# Patient Record
Sex: MALE | Race: WHITE | Employment: FULL TIME | URBAN - METROPOLITAN AREA
[De-identification: names, ages, dates, MRNs, and addresses within clinical notes are randomized per-mention and may not be internally consistent; named-entity substitution may affect disease eponyms.]

---

## 2018-02-16 ENCOUNTER — OFFICE VISIT (OUTPATIENT)
Dept: INTERNAL MEDICINE CLINIC | Age: 63
End: 2018-02-16

## 2018-02-16 VITALS
OXYGEN SATURATION: 97 % | DIASTOLIC BLOOD PRESSURE: 100 MMHG | HEIGHT: 67 IN | TEMPERATURE: 98.3 F | HEART RATE: 74 BPM | WEIGHT: 171 LBS | BODY MASS INDEX: 26.84 KG/M2 | RESPIRATION RATE: 18 BRPM | SYSTOLIC BLOOD PRESSURE: 160 MMHG

## 2018-02-16 DIAGNOSIS — I10 ESSENTIAL HYPERTENSION: ICD-10-CM

## 2018-02-16 DIAGNOSIS — H71.91 CHOLESTEATOMA OF RIGHT EAR: ICD-10-CM

## 2018-02-16 LAB — HBA1C MFR BLD HPLC: 7.5 %

## 2018-02-16 RX ORDER — METFORMIN HYDROCHLORIDE 1000 MG/1
1000 TABLET ORAL 2 TIMES DAILY WITH MEALS
Qty: 60 TAB | Refills: 2 | Status: SHIPPED | OUTPATIENT
Start: 2018-02-16 | End: 2018-08-17 | Stop reason: SDUPTHER

## 2018-02-16 RX ORDER — GLIPIZIDE 10 MG/1
10 TABLET ORAL 2 TIMES DAILY
Qty: 60 TAB | Refills: 2 | Status: SHIPPED | OUTPATIENT
Start: 2018-02-16 | End: 2018-09-04 | Stop reason: SDUPTHER

## 2018-02-16 RX ORDER — LISINOPRIL 40 MG/1
40 TABLET ORAL DAILY
Qty: 30 TAB | Refills: 2 | Status: SHIPPED | OUTPATIENT
Start: 2018-02-16 | End: 2018-09-04 | Stop reason: SDUPTHER

## 2018-02-16 RX ORDER — HYDROCHLOROTHIAZIDE 25 MG/1
25 TABLET ORAL DAILY
Qty: 30 TAB | Refills: 2 | Status: SHIPPED | OUTPATIENT
Start: 2018-02-16 | End: 2018-09-04 | Stop reason: SDUPTHER

## 2018-02-16 RX ORDER — LISINOPRIL 10 MG/1
TABLET ORAL DAILY
COMMUNITY
End: 2018-02-16 | Stop reason: SDUPTHER

## 2018-02-17 NOTE — PROGRESS NOTES
Subjective:     Chief Complaint   Patient presents with   [facility] Follow Up    Medication Refill    Hypertension    Diabetes        He  is a 60y.o. year old male with h/o DM, HTN who presents today after two years for follow up on his chronic condition . He reports that in December 207 he was hospitalized for right ear cholesteatoma and elevated blood sugar. Reports that he was admitted for three weeks, had surgery in his right ear. States that in the hospital he was started on Insulin and continued metformin and Glipizide. He is currently on 10 units of Novolin 70/30 in AM and PM.  He ran out of meds for few days . Does not check BS at home. His blood pressure is elevated today, states he ran out of his blood pressure medication 1 day ago, needs refills. He is currently on Lisinopril 10 mg and HCTZ 25 mg. He was on lisinopril 40 mg in the past but he is not sure why lisinopril was changed to 10 in the hospital.    Patient denies any fever, malaise, chills, shortness of breath  at rest or exertion, wheezing, chest pain or palpitation. Pertinent items are noted in HPI. Objective:     Vitals:    02/16/18 1414 02/16/18 1442   BP: (!) 182/107 (!) 160/100   Pulse: 74    Resp: 18    Temp: 98.3 °F (36.8 °C)    TempSrc: Temporal    SpO2: 97%    Weight: 171 lb (77.6 kg)    Height: 5' 7\" (1.702 m)        Physical Examination: General appearance - alert, well appearing, and in no distress, oriented to person, place, and time and normal appearing weight  Mental status - alert, oriented to person, place, and time, normal mood, behavior, speech, dress, motor activity, and thought processes  Ears - decrease hearing on right ear. No oozing noted. Cholesteatoma in right era.    Chest - clear to auscultation, no wheezes, rales or rhonchi, symmetric air entry  Heart - normal rate, regular rhythm, normal S1, S2, no murmurs, rubs, clicks or gallops    No Known Allergies   Social History     Social History    Marital status:      Spouse name: N/A    Number of children: N/A    Years of education: N/A     Social History Main Topics    Smoking status: Current Every Day Smoker     Packs/day: 0.25    Smokeless tobacco: Never Used    Alcohol use No    Drug use: Yes     Special: Marijuana      Comment: once per month    Sexual activity: Yes     Other Topics Concern    None     Social History Narrative      Family History   Problem Relation Age of Onset    Hypertension Mother     Stroke Mother       Past Surgical History:   Procedure Laterality Date    HX APPENDECTOMY      HX ORTHOPAEDIC      LEFT ankle fracture repair. Metal placed. Past Medical History:   Diagnosis Date    Diabetes ([facility] [address]     Hypertension     Other ill-defined conditions(799.89)     Diverticulitis      Current Outpatient Prescriptions   Medication Sig Dispense Refill    insulin NPH/insulin regular (NOVOLIN 70/30 U-100 INSULIN) 100 unit/mL (70-30) injection 10 Units by SubCUTAneous route Daily (before breakfast).  hydroCHLOROthiazide (HYDRODIURIL) 25 mg tablet Take 1 Tab by mouth daily. Indications: hypertension 30 Tab 2    lisinopril (PRINIVIL, ZESTRIL) 40 mg tablet Take 1 Tab by mouth daily. 30 Tab 2    metFORMIN (GLUCOPHAGE) 1,000 mg tablet Take 1 Tab by mouth two (2) times daily (with meals). Indications: type 2 diabetes mellitus 60 Tab 2    glipiZIDE (GLUCOTROL) 10 mg tablet Take 1 Tab by mouth two (2) times a day. Indications: type 2 diabetes mellitus 60 Tab 2    Lancets (FREESTYLE LANCETS) misc Check fasting  and before dinner sugar 1 Each 11    glucose blood VI test strips (FREESTYLE LITE STRIPS) strip Check fasting  and before dinner sugar 100 Strip 11    Blood-Glucose Meter (FREESTYLE LITE METER) monitoring kit Check fasting  and before dinner sugar 1 Kit 0        Assessment/ Plan:   Diagnoses and all orders for this visit:    1.  Uncontrolled type 2 diabetes mellitus with complication, with long-term current use of insulin ([facility])  -     AMB POC HEMOGLOBIN A1C is 7.5. Continue current med. Follow up in 3 months. Lab Results   Component Value Date/Time    Hemoglobin A1c 11.3 (H) 09/22/2015 08:59 AM    Hemoglobin A1c (POC) 7.5 02/16/2018 02:25 PM       -     METABOLIC PANEL, BASIC    2. Essential hypertension  -     hydroCHLOROthiazide (HYDRODIURIL) 25 mg tablet; Take 1 Tab by mouth daily. Indications: hypertension  -     METABOLIC PANEL, BASIC  -     lisinopril (PRINIVIL, ZESTRIL) 40 mg tablet; Take 1 Tab by mouth daily.  -    BP is uncontrolled. Did not take med for only one day. Increase lisinopril to previous dose. -    Advised to call in two weeks with BP number. 3. Uncontrolled type 2 diabetes mellitus without complication, with long-term current use of insulin ([facility])  -     metFORMIN (GLUCOPHAGE) 1,000 mg tablet; Take 1 Tab by mouth two (2) times daily (with meals). Indications: type 2 diabetes mellitus  -     glipiZIDE (GLUCOTROL) 10 mg tablet; Take 1 Tab by mouth two (2) times a day. Indications: type 2 diabetes mellitus    4. Cholesteatoma of right ear         - continue follow up with ENT. Medication risks/benefits/costs/interactions/alternatives discussed with patient. Advised patient to call back or return to office if symptoms worsen/change/persist. If patient cannot reach us or should anything more severe/urgent arise he/she should proceed directly to the nearest emergency department. Discussed expected course/resolution/complications of diagnosis in detail with patient. Patient given a written after visit summary which includes her diagnoses, current medications and vitals. Patient expressed understanding with the diagnosis and plan. Follow-up Disposition:  Return in about 1 month (around 3/16/2018) for Bring BP log book. Spencer Sheets

## 2018-08-17 RX ORDER — METFORMIN HYDROCHLORIDE 1000 MG/1
TABLET ORAL
Qty: 60 TAB | Refills: 2 | Status: SHIPPED | OUTPATIENT
Start: 2018-08-17 | End: 2018-08-20 | Stop reason: SDUPTHER

## 2018-08-20 RX ORDER — METFORMIN HYDROCHLORIDE 1000 MG/1
TABLET ORAL
Qty: 60 TAB | Refills: 0 | Status: SHIPPED | OUTPATIENT
Start: 2018-08-20 | End: 2018-09-04 | Stop reason: SDUPTHER

## 2018-09-04 ENCOUNTER — OFFICE VISIT (OUTPATIENT)
Dept: INTERNAL MEDICINE CLINIC | Age: 63
End: 2018-09-04

## 2018-09-04 VITALS
OXYGEN SATURATION: 97 % | TEMPERATURE: 97.4 F | WEIGHT: 184.6 LBS | BODY MASS INDEX: 28.97 KG/M2 | HEART RATE: 83 BPM | DIASTOLIC BLOOD PRESSURE: 115 MMHG | RESPIRATION RATE: 18 BRPM | SYSTOLIC BLOOD PRESSURE: 193 MMHG | HEIGHT: 67 IN

## 2018-09-04 DIAGNOSIS — I10 ESSENTIAL HYPERTENSION: ICD-10-CM

## 2018-09-04 DIAGNOSIS — E11.29 TYPE 2 DIABETES MELLITUS WITH MICROALBUMINURIA, WITH LONG-TERM CURRENT USE OF INSULIN (HCC): Primary | ICD-10-CM

## 2018-09-04 DIAGNOSIS — R80.9 TYPE 2 DIABETES MELLITUS WITH MICROALBUMINURIA, WITH LONG-TERM CURRENT USE OF INSULIN (HCC): Primary | ICD-10-CM

## 2018-09-04 DIAGNOSIS — G47.9 SLEEPING DIFFICULTIES: ICD-10-CM

## 2018-09-04 DIAGNOSIS — Z79.4 TYPE 2 DIABETES MELLITUS WITH MICROALBUMINURIA, WITH LONG-TERM CURRENT USE OF INSULIN (HCC): Primary | ICD-10-CM

## 2018-09-04 LAB — HBA1C MFR BLD HPLC: 6.3 %

## 2018-09-04 RX ORDER — METFORMIN HYDROCHLORIDE 1000 MG/1
TABLET ORAL
Qty: 60 TAB | Refills: 2 | Status: SHIPPED | OUTPATIENT
Start: 2018-09-04 | End: 2019-05-10 | Stop reason: SDUPTHER

## 2018-09-04 RX ORDER — HYDROCHLOROTHIAZIDE 25 MG/1
25 TABLET ORAL DAILY
Qty: 90 TAB | Refills: 0 | Status: SHIPPED | OUTPATIENT
Start: 2018-09-04 | End: 2019-05-10 | Stop reason: SDUPTHER

## 2018-09-04 RX ORDER — PEN NEEDLE, DIABETIC 30 GX3/16"
NEEDLE, DISPOSABLE MISCELLANEOUS
Qty: 1 PACKAGE | Refills: 11 | Status: SHIPPED | OUTPATIENT
Start: 2018-09-04

## 2018-09-04 RX ORDER — GLIPIZIDE 10 MG/1
10 TABLET ORAL 2 TIMES DAILY
Qty: 60 TAB | Refills: 2 | Status: SHIPPED | OUTPATIENT
Start: 2018-09-04 | End: 2019-05-10 | Stop reason: SDUPTHER

## 2018-09-04 RX ORDER — LISINOPRIL 40 MG/1
40 TABLET ORAL DAILY
Qty: 90 TAB | Refills: 0 | Status: SHIPPED | OUTPATIENT
Start: 2018-09-04 | End: 2019-05-10 | Stop reason: SDUPTHER

## 2018-09-04 NOTE — PROGRESS NOTES
Subjective: Chief Complaint Patient presents with  Diabetes  Sleep Problem  
  trouble sleeping x 2 months. States that he has to smoke marijuana to sleep  Hypertension  Medication Refill  
  states that he has been out of all medication x 2 days He  is a 58y.o. year old male with h/o DM, HTN who presents today after 7 months  for follow up on his chronic condition . DM-2: He was started on Insulin and continued metformin and Glipizide about 9 months ago in the hospital.   He is currently on 10 units of Novolin 70/30 in AM and PM. He ran out of meds 5 days ago. States that his FBS has been running in 90- lower 100s range and during supper its around 120s. HTN: His blood pressure is elevated today, states he ran out of his blood pressure medication 5 days ago, needs refills. He is currently on Lisinopril 40 mg and HCTZ 25 mg. He is also here with a c/o difficulty sleeping. Reports that he has been using marijuana to help with sleep. Never tried any OTC supplement. States that he smoke 3-4 cig /day. No plan on quitting. Denies any chest pain, soa, cough, leg swelling, urinary symptoms. Reports that he had his eye exam 2-3 months ago. Advised to bring records. Pertinent items are noted in HPI. Objective:  
 
Vitals:  
 09/04/18 0740 BP: (!) 193/115 Pulse: 83 Resp: 18 Temp: 97.4 °F (36.3 °C) TempSrc: Temporal  
SpO2: 97% Weight: 184 lb 9.6 oz (83.7 kg) Height: 5' 7\" (1.702 m) Physical Examination: General appearance - alert, well appearing, and in no distress, oriented to person, place, and time and normal appearing weight Mental status - alert, oriented to person, place, and time, normal mood, behavior, speech, dress, motor activity, and thought processes Eyes - pupils equal and reactive, extraocular eye movements intact Chest - clear to auscultation, no wheezes, rales or rhonchi, symmetric air entry Heart - normal rate, regular rhythm, normal S1, S2, no murmurs, rubs, clicks or gallops Extremities - no pedal edema noted No Known Allergies Social History Social History  Marital status:  Spouse name: N/A  
 Number of children: N/A  
 Years of education: N/A Social History Main Topics  Smoking status: Current Every Day Smoker Packs/day: 0.25  Smokeless tobacco: Never Used  Alcohol use No  
 Drug use: Yes Special: Marijuana Comment: once per month  Sexual activity: Yes Other Topics Concern  None Social History Narrative Family History Problem Relation Age of Onset  Hypertension Mother  Stroke Mother Past Surgical History:  
Procedure Laterality Date  HX APPENDECTOMY  HX ORTHOPAEDIC    
 LEFT ankle fracture repair. Metal placed. Past Medical History:  
Diagnosis Date  Diabetes ([facility] [address]  Hypertension  Other ill-defined conditions(799.89) Diverticulitis Current Outpatient Prescriptions Medication Sig Dispense Refill  metFORMIN (GLUCOPHAGE) 1,000 mg tablet TAKE 1 TABLET BY MOUTH TWICE DAILY WITH MEALS FOR DIABETES 60 Tab 2  
 glipiZIDE (GLUCOTROL) 10 mg tablet Take 1 Tab by mouth two (2) times a day. Indications: type 2 diabetes mellitus 60 Tab 2  
 insulin NPH/insulin regular (NOVOLIN 70/30 U-100 INSULIN) 100 unit/mL (70-30) injection 10 Units by SubCUTAneous route two (2) times daily (with meals). 10 mL 2  
 lisinopril (PRINIVIL, ZESTRIL) 40 mg tablet Take 1 Tab by mouth daily. 90 Tab 0  
 hydroCHLOROthiazide (HYDRODIURIL) 25 mg tablet Take 1 Tab by mouth daily. Indications: hypertension 90 Tab 0  
 Insulin Needles, Disposable, 31 gauge x 5/16\" ndle Use twice/day. 1 Package 11  Lancets (FREESTYLE LANCETS) misc Check fasting  and before dinner sugar 1 Each 11  
 glucose blood VI test strips (FREESTYLE LITE STRIPS) strip Check fasting  and before dinner sugar 100 Strip 11  
  Blood-Glucose Meter (FREESTYLE LITE METER) monitoring kit Check fasting  and before dinner sugar 1 Kit 0 Assessment/ Plan:  
Diagnoses and all orders for this visit: 
 
1. Type 2 diabetes mellitus with microalbuminuria, with long-term current use of insulin (HCC) -     AMB POC HEMOGLOBIN A1C 
-     MICROALBUMIN, UR, RAND W/ MICROALB/CREAT RATIO 
-     metFORMIN (GLUCOPHAGE) 1,000 mg tablet; TAKE 1 TABLET BY MOUTH TWICE DAILY WITH MEALS FOR DIABETES 
-     glipiZIDE (GLUCOTROL) 10 mg tablet; Take 1 Tab by mouth two (2) times a day. Indications: type 2 diabetes mellitus 
-     insulin NPH/insulin regular (NOVOLIN 70/30 U-100 INSULIN) 100 unit/mL (70-30) injection; 10 Units by SubCUTAneous route two (2) times daily (with meals). -     METABOLIC PANEL, COMPREHENSIVE 
-     Insulin Needles, Disposable, 31 gauge x 5/16\" ndle; Use twice/day. 2. Essential hypertension -     BP is very elevated today. Did not take med for the past 5 days. Restart lisinopril (PRINIVIL, ZESTRIL) 40 mg tablet; Take 1 Tab by mouth daily. 
-    Restart  hydroCHLOROthiazide (HYDRODIURIL) 25 mg tablet; Take 1 Tab by mouth daily. Indications: hypertension -     METABOLIC PANEL, COMPREHENSIVE 3. Sleeping difficulties - Advised to try OTC Melatonin. Follow up if symptoms worsen. Medication risks/benefits/costs/interactions/alternatives discussed with patient. Advised patient to call back or return to office if symptoms worsen/change/persist. If patient cannot reach us or should anything more severe/urgent arise he/she should proceed directly to the nearest emergency department. Discussed expected course/resolution/complications of diagnosis in detail with patient. Patient given a written after visit summary which includes her diagnoses, current medications and vitals. Patient expressed understanding with the diagnosis and plan. Follow-up Disposition: Return in about 2 weeks (around 9/18/2018) for Bring BP log book. . will have cholesterol check up. Charley Mckee

## 2018-09-04 NOTE — MR AVS SNAPSHOT
[facility] [address] [place] [address] 
[phone] Patient: Ady Herrera MRN: [id number] CMO:8/5/[id number] Visit Information Date & Time Provider Department Dept. Phone Encounter #  
 9/4/2018  7:30 AM Edwardo Morgan MD [facility] Internal Medicine [phone] [id number] Follow-up Instructions Return in about 2 weeks (around 9/18/2018) for Bring BP log book. . will have cholesterol check up. Jefe Centeno Upcoming Health Maintenance Date Due  
 LIPID PANEL Q1 1955 EYE EXAM RETINAL OR DILATED Q1 7/4/1964 Pneumococcal 19-64 Medium Risk (1 of 1 - PPSV23) 7/4/1973 DTaP/Tdap/Td series (1 - Tdap) 7/4/1975 MICROALBUMIN Q1 9/22/2016 FOBT Q 1 YEAR AGE 50-75 10/6/2016 FOOT EXAM Q1 1/9/2017 Influenza Age 5 to Adult 8/1/2018 HEMOGLOBIN A1C Q6M 8/16/2018 Allergies as of 9/4/2018  Review Complete On: 9/4/2018 By: Berry Garcia MD  
 No Known Allergies Current Immunizations  Reviewed on 9/4/2018 Name Date Influenza Vaccine Lavaun Sorrel) 9/22/2015 Pneumococcal Conjugate (PCV-13) 9/22/2015 Reviewed by Berry Garcia MD on 9/4/2018 at  7:50 AM  
You Were Diagnosed With   
  
 Codes Comments Uncontrolled type 2 diabetes mellitus with complication, with long-term current use of insulin (HCC)    -  Primary ICD-10-CM: E11.8, E11.65, Z79.4 ICD-9-CM: 250.82, V58.67 Essential hypertension     ICD-10-CM: I10 
ICD-9-CM: 401.9 Vitals BP Pulse Temp Resp Height(growth percentile) (!) 193/115 (BP 1 Location: Left arm, BP Patient Position: Sitting) 83 97.4 °F (36.3 °C) (Temporal) 18 5' 7\" (1.702 m) Weight(growth percentile) SpO2 BMI Smoking Status 184 lb 9.6 oz (83.7 kg) 97% 28.91 kg/m2 Current Every Day Smoker Vitals History BMI and BSA Data Body Mass Index Body Surface Area  
 28.91 kg/m 2 1.99 m 2 Preferred Pharmacy Pharmacy Name Phone [address], [address] [place] [address] [phone] Your Updated Medication List  
  
   
This list is accurate as of 9/4/18  7:58 AM.  Always use your most recent med list.  
  
  
  
  
 Blood-Glucose Meter monitoring kit Commonly known as:  FREESTYLE LITE METER Check fasting  and before dinner sugar  
  
 glipiZIDE 10 mg tablet Commonly known as:  Gladis Echo Take 1 Tab by mouth two (2) times a day. Indications: type 2 diabetes mellitus  
  
 glucose blood VI test strips strip Commonly known as:  FREESTYLE LITE STRIPS Check fasting  and before dinner sugar  
  
 hydroCHLOROthiazide 25 mg tablet Commonly known as:  HYDRODIURIL Take 1 Tab by mouth daily. Indications: hypertension  
  
 insulin NPH/insulin regular 100 unit/mL (70-30) injection Commonly known as:  NovoLIN 70/30 U-100 Insulin 10 Units by SubCUTAneous route two (2) times daily (with meals). Lancets Misc Commonly known as:  FREESTYLE LANCETS Check fasting  and before dinner sugar  
  
 lisinopril 40 mg tablet Commonly known as:  Thersia Kubas Take 1 Tab by mouth daily. metFORMIN 1,000 mg tablet Commonly known as:  GLUCOPHAGE  
TAKE 1 TABLET BY MOUTH TWICE DAILY WITH MEALS FOR DIABETES Prescriptions Sent to Pharmacy Refills  
 metFORMIN (GLUCOPHAGE) 1,000 mg tablet 2 Sig: TAKE 1 TABLET BY MOUTH TWICE DAILY WITH MEALS FOR DIABETES Class: Normal  
 Pharmacy: [address] [place] [address] Ph #: [phone]  
 glipiZIDE (GLUCOTROL) 10 mg tablet 2 Sig: Take 1 Tab by mouth two (2) times a day. Indications: type 2 diabetes mellitus Class: Normal  
 Pharmacy: [address] [place] [address]  Ph #: [phone] Route: Oral  
 insulin NPH/insulin regular (NOVOLIN 70/30 U-100 INSULIN) 100 unit/mL (70-30) injection 2 Sig: 10 Units by SubCUTAneous route two (2) times daily (with meals).   
 Class: Normal  
 Pharmacy: [address] [place] [address] Dr Ph #: [phone] Route: SubCUTAneous  
 lisinopril (PRINIVIL, ZESTRIL) 40 mg tablet 0 Sig: Take 1 Tab by mouth daily. Class: Normal  
 Pharmacy: [address] [place] [address] Dr Ph #: [phone] Route: Oral  
 hydroCHLOROthiazide (HYDRODIURIL) 25 mg tablet 0 Sig: Take 1 Tab by mouth daily. Indications: hypertension Class: Normal  
 Pharmacy: [address] [place] [address] Dr Ph #: [phone] Route: Oral  
  
We Performed the Following AMB POC HEMOGLOBIN A1C [33445 CPT(R)] METABOLIC PANEL, COMPREHENSIVE [16719 CPT(R)] MICROALBUMIN, UR, RAND W/ MICROALB/CREAT RATIO [id number] CPT(R)] Follow-up Instructions Return in about 2 weeks (around 9/18/2018) for Bring BP log book. . will have cholesterol check up. .  
  
  
Introducing [facility]! [employer] introduces [employer] patient portal. Now you can access parts of your medical record, email your doctor's office, and request medication refills online. 1. In your internet browser, go to [email or web address] 2. Click on the First Time User? Click Here link in the Sign In box. You will see the New Member Sign Up page. 3. Enter your [employer] Access Code exactly as it appears below. You will not need to use this code after youve completed the sign-up process. If you do not sign up before the expiration date, you must request a new code. · [employer] Access Code: [email or web address] Expires: 12/3/2018  7:42 AM 
 
4. Enter the last four digits of your Social Security Number (xxxx) and Date of Birth (mm/dd/yyyy) as indicated and click Submit. You will be taken to the next sign-up page. 5. Create a [employer] ID. This will be your [employer] login ID and cannot be changed, so think of one that is secure and easy to remember. 6. Create a [employer] password. You can change your password at any time. 7. Enter your Password Reset Question and Answer. This can be used at a later time if you forget your password. 8. Enter your e-mail address. You will receive e-mail notification when new information is available in [address] 9. Click Sign Up. You can now view and download portions of your medical record. 10. Click the Download Summary menu link to download a portable copy of your medical information. If you have questions, please visit the Frequently Asked Questions section of the [employer] website. Remember, [employer] is NOT to be used for urgent needs. For medical emergencies, dial 911. Now available from your iPhone and Android! Please provide this summary of care documentation to your next provider. Your primary care clinician is listed as Edwardo Mathew. If you have any questions after today's visit, please call [phone].

## 2018-09-05 LAB
ALBUMIN SERPL-MCNC: 4.5 G/DL (ref 3.6–4.8)
ALBUMIN/CREAT UR: 22.8 MG/G CREAT (ref 0–30)
ALBUMIN/GLOB SERPL: 1.6 {RATIO} (ref 1.2–2.2)
ALP SERPL-CCNC: 82 IU/L (ref 39–117)
ALT SERPL-CCNC: 32 IU/L (ref 0–44)
AST SERPL-CCNC: 21 IU/L (ref 0–40)
BILIRUB SERPL-MCNC: 0.5 MG/DL (ref 0–1.2)
BUN SERPL-MCNC: 12 MG/DL (ref 8–27)
BUN/CREAT SERPL: 10 (ref 10–24)
CALCIUM SERPL-MCNC: 9.7 MG/DL (ref 8.6–10.2)
CHLORIDE SERPL-SCNC: 101 MMOL/L (ref 96–106)
CO2 SERPL-SCNC: 25 MMOL/L (ref 20–29)
CREAT SERPL-MCNC: 1.24 MG/DL (ref 0.76–1.27)
CREAT UR-MCNC: 151.7 MG/DL
GLOBULIN SER CALC-MCNC: 2.8 G/DL (ref 1.5–4.5)
GLUCOSE SERPL-MCNC: 123 MG/DL (ref 65–99)
MICROALBUMIN UR-MCNC: 34.6 UG/ML
POTASSIUM SERPL-SCNC: 4.7 MMOL/L (ref 3.5–5.2)
PROT SERPL-MCNC: 7.3 G/DL (ref 6–8.5)
SODIUM SERPL-SCNC: 142 MMOL/L (ref 134–144)

## 2019-04-05 NOTE — PATIENT INSTRUCTIONS
12 hour chart check    Cholesteatoma: Care Instructions  Your Care Instructions    A cholesteatoma (say \"kuh-LESS-lolis--ATTILA[facility]\") is a growth (cyst) inside your ear. The most common cause is repeated ear infections. Once the growth starts, ear infections make it grow larger. You may feel pain and pressure in or near your ear. You also may have bad-smelling fluid that drains from your ear. You may lose hearing in that ear. Hearing may come back after the growth is removed. Your doctor may do tests to find out the size and shape of the growth. He or she may clean your ear and prescribe antibiotics to stop the infection. In most cases, people have surgery to remove the growth. After surgery, you will need follow-up care to be sure the growth and infection are gone. Follow-up care is a key part of your treatment and safety. Be sure to make and go to all appointments, and call your doctor if you are having problems. It's also a good idea to know your test results and keep a list of the medicines you take. How can you care for yourself at home? · If your doctor prescribed antibiotics, take them as directed. Do not stop taking them just because you feel better. You need to take the full course of antibiotics. · Take an over-the-counter pain medicine, such as acetaminophen (Tylenol), ibuprofen (Advil, Motrin), or naproxen (Aleve), as needed. Read and follow all instructions on the label. · Do not take two or more pain medicines at the same time unless the doctor told you to. Many pain medicines have acetaminophen, which is Tylenol. Too much acetaminophen (Tylenol) can be harmful. · To ease pain, put a warm washcloth or a heating pad set on low on your ear. You may have some drainage from the ear. · If you are going to have the growth removed, your doctor will give you instructions for care before and after surgery. · Do not put anything into your ear canal. For example, do not use a cotton swab to clean the inside of your ear.  It can damage the inside of your ear. If you think you have something inside your ear, ask your doctor to check it. When should you call for help? Call your doctor now or seek immediate medical care if:  ? · Your pain gets worse. ? · You have signs of infection, such as:  ¨ Increased pain, swelling, warmth, or redness. ¨ Pus draining from the ear. ¨ A fever. ? · The side of your face seems to sag. ? Watch closely for changes in your health, and be sure to contact your doctor if:  ? · You feel dizzy. ? · You do not get better as expected. Where can you learn more? Go to http://gogo-sara.info/. Enter A069 in the search box to learn more about \"Cholesteatoma: Care Instructions. \"  Current as of: May 12, 2017  Content Version: 11.4  © 5841-2646 [employer]. Care instructions adapted under license by [employer] (which disclaims liability or warranty for this information). If you have questions about a medical condition or this instruction, always ask your healthcare professional. [address] any warranty or liability for your use of this information.

## 2019-05-10 ENCOUNTER — OFFICE VISIT (OUTPATIENT)
Dept: PRIMARY CARE CLINIC | Age: 64
End: 2019-05-10

## 2019-05-10 VITALS
SYSTOLIC BLOOD PRESSURE: 150 MMHG | RESPIRATION RATE: 16 BRPM | OXYGEN SATURATION: 99 % | HEART RATE: 99 BPM | WEIGHT: 187 LBS | BODY MASS INDEX: 29.35 KG/M2 | HEIGHT: 67 IN | DIASTOLIC BLOOD PRESSURE: 105 MMHG | TEMPERATURE: 98.1 F

## 2019-05-10 DIAGNOSIS — E11.29 TYPE 2 DIABETES MELLITUS WITH MICROALBUMINURIA, WITH LONG-TERM CURRENT USE OF INSULIN (HCC): ICD-10-CM

## 2019-05-10 DIAGNOSIS — I10 ESSENTIAL HYPERTENSION: ICD-10-CM

## 2019-05-10 DIAGNOSIS — R80.9 TYPE 2 DIABETES MELLITUS WITH MICROALBUMINURIA, WITH LONG-TERM CURRENT USE OF INSULIN (HCC): ICD-10-CM

## 2019-05-10 DIAGNOSIS — Z79.4 TYPE 2 DIABETES MELLITUS WITH MICROALBUMINURIA, WITH LONG-TERM CURRENT USE OF INSULIN (HCC): ICD-10-CM

## 2019-05-10 LAB — HBA1C MFR BLD HPLC: 6.5 %

## 2019-05-10 RX ORDER — GLIPIZIDE 10 MG/1
10 TABLET ORAL 2 TIMES DAILY
Qty: 60 TAB | Refills: 2 | Status: SHIPPED | OUTPATIENT
Start: 2019-05-10 | End: 2019-10-21 | Stop reason: SDUPTHER

## 2019-05-10 RX ORDER — METFORMIN HYDROCHLORIDE 1000 MG/1
TABLET ORAL
Qty: 60 TAB | Refills: 2 | Status: SHIPPED | OUTPATIENT
Start: 2019-05-10 | End: 2019-10-21 | Stop reason: SDUPTHER

## 2019-05-10 RX ORDER — HYDROCHLOROTHIAZIDE 25 MG/1
25 TABLET ORAL DAILY
Qty: 90 TAB | Refills: 0 | Status: SHIPPED | OUTPATIENT
Start: 2019-05-10 | End: 2019-10-21 | Stop reason: SDUPTHER

## 2019-05-10 RX ORDER — LISINOPRIL 40 MG/1
40 TABLET ORAL DAILY
Qty: 90 TAB | Refills: 0 | Status: SHIPPED | OUTPATIENT
Start: 2019-05-10 | End: 2019-10-21 | Stop reason: SDUPTHER

## 2019-05-10 NOTE — PROGRESS NOTES
Subjective: Chief Complaint Patient presents with  Medication Refill  
  all meds  Diabetes a1c check He  is a 58y.o. year old male h/o DM, HTN who presents today after 7 months  for follow up on his chronic condition . 
  
DM-2: He was started on Insulin and continued metformin and Glipizide.   He is currently on 10 units of Novolin 70/30 in AM and PM. He reports that he is taking insulin  only one time /day instead. . States that his FBS has been running in 90- lower 100s range and during supper its around 120s. last A1c was 6.3 HTN: His blood pressure is elevated today, . He is currently on Lisinopril 40 mg and HCTZ 25 mg.  
  
 
  
States that he smoke 3-4 cig /day. No plan on quitting.  
  
Denies any chest pain, soa, cough, leg swelling, urinary symptoms. Reports that he had his eye exam with one year. Advised to bring records. Pertinent items are noted in HPI. Objective:  
 
Vitals:  
 05/10/19 0901 BP: (!) 150/105 Pulse: 99 Resp: 16 Temp: 98.1 °F (36.7 °C) TempSrc: Oral  
SpO2: 99% Weight: 187 lb (84.8 kg) Height: 5' 7\" (1.702 m) Physical Examination: General appearance - alert, well appearing, and in no distress and oriented to person, place, and time Mental status - alert, oriented to person, place, and time Chest - clear to auscultation, no wheezes, rales or rhonchi, symmetric air entry Heart - normal rate, regular rhythm, normal S1, S2, no murmurs, rubs, clicks or gallops Extremities - peripheral pulses normal, no pedal edema, no clubbing or cyanosis, No Known Allergies Social History Socioeconomic History  Marital status:  Spouse name: Not on file  Number of children: Not on file  Years of education: Not on file  Highest education level: Not on file Tobacco Use  Smoking status: Current Every Day Smoker Packs/day: 0.25  Smokeless tobacco: Never Used Substance and Sexual Activity  Alcohol use:  No  
  Drug use: Yes Types: Marijuana  Sexual activity: Yes Family History Problem Relation Age of Onset  Hypertension Mother  Stroke Mother Past Surgical History:  
Procedure Laterality Date  HX APPENDECTOMY  HX ORTHOPAEDIC    
 LEFT ankle fracture repair. Metal placed. Past Medical History:  
Diagnosis Date  Diabetes ([address]  Hypertension  Other ill-defined conditions(799.89) Diverticulitis Current Outpatient Medications Medication Sig Dispense Refill  metFORMIN (GLUCOPHAGE) 1,000 mg tablet TAKE 1 TABLET BY MOUTH TWICE DAILY WITH MEALS FOR DIABETES 60 Tab 2  
 glipiZIDE (GLUCOTROL) 10 mg tablet Take 1 Tab by mouth two (2) times a day. Indications: type 2 diabetes mellitus 60 Tab 2  
 insulin NPH/insulin regular (NOVOLIN 70/30 U-100 INSULIN) 100 unit/mL (70-30) injection 10 Units by SubCUTAneous route two (2) times daily (with meals). (Patient taking differently: 10 Units by SubCUTAneous route once.) 10 mL 2  
 lisinopril (PRINIVIL, ZESTRIL) 40 mg tablet Take 1 Tab by mouth daily. 90 Tab 0  
 hydroCHLOROthiazide (HYDRODIURIL) 25 mg tablet Take 1 Tab by mouth daily. Indications: hypertension 90 Tab 0  
 Insulin Needles, Disposable, 31 gauge x 5/16\" ndle Use twice/day. 1 Package 11  Lancets (FREESTYLE LANCETS) misc Check fasting  and before dinner sugar 1 Each 11  
 glucose blood VI test strips (FREESTYLE LITE STRIPS) strip Check fasting  and before dinner sugar 100 Strip 11  Blood-Glucose Meter (FREESTYLE LITE METER) monitoring kit Check fasting  and before dinner sugar 1 Kit 0 Assessment/ Plan:  
Diagnoses and all orders for this visit: 
 
1. Type 2 diabetes mellitus with microalbuminuria, with long-term current use of insulin ([facility]) -    Continue  glipiZIDE (GLUCOTROL) 10 mg tablet; Take 1 Tab by mouth two (2) times a day. Indications: type 2 diabetes mellitus -     Cut down insulin NPH/insulin regular (NOVOLIN 70/30 U-100 INSULIN) 100 unit/mL (70-30) injection; 5 Units by SubCUTAneous route two (2) times daily (with meals). -    Continue. metFORMIN (GLUCOPHAGE) 1,000 mg tablet; TAKE 1 TABLET BY MOUTH TWICE DAILY WITH MEALS FOR DIABETES 
-     AMB POC HEMOGLOBIN A1C is 6.5. Well controlled. 2. Essential hypertension -    BP is high in office toady. He was out of med for the past few days. compliance discussed. hydroCHLOROthiazide (HYDRODIURIL) 25 mg tablet; Take 1 Tab by mouth daily. Indications: high blood pressure 
-     lisinopril (PRINIVIL, ZESTRIL) 40 mg tablet; Take 1 Tab by mouth daily. patient does not have health insurance. Would like to wait for blood work and next appointment., Medication risks/benefits/costs/interactions/alternatives discussed with patient. Advised patient to call back or return to office if symptoms worsen/change/persist. If patient cannot reach us or should anything more severe/urgent arise he/she should proceed directly to the nearest emergency department. Discussed expected course/resolution/complications of diagnosis in detail with patient. Patient given a written after visit summary which includes her diagnoses, current medications and vitals. Patient expressed understanding with the diagnosis and plan. Follow-up and Dispositions · Return in about 3 months (around 8/10/2019) for Cholesterol, blood pressure. Herb Rivera

## 2019-05-10 NOTE — PROGRESS NOTES
Chief Complaint Patient presents with  Medication Refill  
  all meds  Diabetes a1c check 1. Have you been to the ER, urgent care clinic since your last visit? Hospitalized since your last visit? No 
 
2. Have you seen or consulted any other health care providers outside of the [facility] since your last visit? Include any pap smears or colon screening.  No

## 2019-08-16 ENCOUNTER — OFFICE VISIT (OUTPATIENT)
Dept: PRIMARY CARE CLINIC | Age: 64
End: 2019-08-16

## 2019-08-16 VITALS
OXYGEN SATURATION: 97 % | DIASTOLIC BLOOD PRESSURE: 85 MMHG | TEMPERATURE: 97.8 F | WEIGHT: 189.4 LBS | BODY MASS INDEX: 29.73 KG/M2 | HEIGHT: 67 IN | RESPIRATION RATE: 17 BRPM | SYSTOLIC BLOOD PRESSURE: 130 MMHG | HEART RATE: 101 BPM

## 2019-08-16 DIAGNOSIS — E11.29 TYPE 2 DIABETES MELLITUS WITH MICROALBUMINURIA, WITH LONG-TERM CURRENT USE OF INSULIN (HCC): Primary | ICD-10-CM

## 2019-08-16 DIAGNOSIS — J20.9 ACUTE BRONCHITIS, UNSPECIFIED ORGANISM: ICD-10-CM

## 2019-08-16 DIAGNOSIS — Z79.4 TYPE 2 DIABETES MELLITUS WITH MICROALBUMINURIA, WITH LONG-TERM CURRENT USE OF INSULIN (HCC): Primary | ICD-10-CM

## 2019-08-16 DIAGNOSIS — R80.9 TYPE 2 DIABETES MELLITUS WITH MICROALBUMINURIA, WITH LONG-TERM CURRENT USE OF INSULIN (HCC): Primary | ICD-10-CM

## 2019-08-16 DIAGNOSIS — R05.9 COUGH: ICD-10-CM

## 2019-08-16 DIAGNOSIS — I10 ESSENTIAL HYPERTENSION: ICD-10-CM

## 2019-08-16 LAB — HBA1C MFR BLD HPLC: 7.1 %

## 2019-08-16 RX ORDER — GUAIFENESIN 600 MG/1
600 TABLET, EXTENDED RELEASE ORAL 2 TIMES DAILY
Qty: 20 TAB | Refills: 0 | Status: SHIPPED | OUTPATIENT
Start: 2019-08-16 | End: 2022-05-17

## 2019-08-16 RX ORDER — METHYLPREDNISOLONE 4 MG/1
TABLET ORAL
Qty: 1 DOSE PACK | Refills: 0 | Status: SHIPPED | OUTPATIENT
Start: 2019-08-16 | End: 2022-05-17

## 2019-08-16 NOTE — PROGRESS NOTES
Subjective:     Chief Complaint   Patient presents with    Diabetes     a1c    Blood Pressure Check        He  is a 71y.o. year old male h/o DM, HTN who presents today  for follow up on his chronic condition as well as with acute concerns. Reports that for the past one week he has been having cough . Reports that symptoms started with cold symptoms followed by this coughing spells. Reports that cough is productive, mostly phlegm. Denies any fever, chills, chest pain, soa, wheezing. He had tried OTC cough syrup which helped some. He continue to smoke.     DM-2: he is on metformin and Glipizide and Insulin.   He is currently on 5 units of Novolin 70/30 in AM and PM.  States that his FBS has been running in 90- lower 100s range and during supper its around 120s. last A1c was 6.5     HTN: BP is well controlled with Lisinopril 40 mg and HCTZ 25 mg.           Pertinent items are noted in HPI. Objective:     Vitals:    08/16/19 1118   BP: (!) 146/97   Pulse: (!) 101   Resp: 17   Temp: 97.8 °F (36.6 °C)   TempSrc: Oral   SpO2: 97%   Weight: 189 lb 6.4 oz (85.9 kg)   Height: 5' 7\" (1.702 m)       Physical Examination: General appearance - alert, well appearing, and in no distress, oriented to person, place, and time and overweight  Mental status - alert, oriented to person, place, and time, normal mood, behavior, speech, dress, motor activity, and thought processes  Ears - bilateral TM's and external ear canals normal  Nose - normal and patent, no erythema, discharge or polyps  Mouth - mucous membranes moist, pharynx normal without lesions  Neck - supple, no significant adenopathy  Chest - clear to auscultation, no wheezes, rales or rhonchi, symmetric air entry. Coughing spells during expiration.    Heart - normal rate, regular rhythm, normal S1, S2, no murmurs, rubs, clicks or gallops      No Known Allergies   Social History     Socioeconomic History    Marital status:      Spouse name: Not on file    Number of children: Not on file    Years of education: Not on file    Highest education level: Not on file   Tobacco Use    Smoking status: Current Every Day Smoker     Packs/day: 0.25    Smokeless tobacco: Never Used   Substance and Sexual Activity    Alcohol use: No    Drug use: Yes     Types: Marijuana    Sexual activity: Yes      Family History   Problem Relation Age of Onset    Hypertension Mother     Stroke Mother       Past Surgical History:   Procedure Laterality Date    HX APPENDECTOMY      HX ORTHOPAEDIC      LEFT ankle fracture repair. Metal placed. Past Medical History:   Diagnosis Date    Diabetes ([facility] [address]     Hypertension     Other ill-defined conditions(799.89)     Diverticulitis      Current Outpatient Medications   Medication Sig Dispense Refill    glipiZIDE (GLUCOTROL) 10 mg tablet Take 1 Tab by mouth two (2) times a day. Indications: type 2 diabetes mellitus 60 Tab 2    hydroCHLOROthiazide (HYDRODIURIL) 25 mg tablet Take 1 Tab by mouth daily. Indications: high blood pressure 90 Tab 0    insulin NPH/insulin regular (NOVOLIN 70/30 U-100 INSULIN) 100 unit/mL (70-30) injection 5 Units by SubCUTAneous route two (2) times daily (with meals). 10 mL 2    lisinopril (PRINIVIL, ZESTRIL) 40 mg tablet Take 1 Tab by mouth daily. 90 Tab 0    metFORMIN (GLUCOPHAGE) 1,000 mg tablet TAKE 1 TABLET BY MOUTH TWICE DAILY WITH MEALS FOR DIABETES 60 Tab 2    Insulin Needles, Disposable, 31 gauge x 5/16\" ndle Use twice/day. 1 Package 11    Lancets (FREESTYLE LANCETS) misc Check fasting  and before dinner sugar 1 Each 11    glucose blood VI test strips (FREESTYLE LITE STRIPS) strip Check fasting  and before dinner sugar 100 Strip 11    Blood-Glucose Meter (FREESTYLE LITE METER) monitoring kit Check fasting  and before dinner sugar 1 Kit 0        Assessment/ Plan:   Diagnoses and all orders for this visit:    1.  Type 2 diabetes mellitus with microalbuminuria, with long-term current use of insulin ([facility]  -     AMB POC HEMOGLOBIN A1C is 7.1, up from 6.5.  -     LIPID PANEL  -     METABOLIC PANEL, COMPREHENSIVE  -    Increase  insulin NPH/insulin regular (NOVOLIN 70/30 U-100 INSULIN) 100 unit/mL (70-30) injection; 6 Units by SubCUTAneous route two (2) times daily (with meals). - continue Metformin and Glipizide. 2. Essential hypertension       - well controlled with current med. 3. Acute bronchitis, unspecified organism  -     methylPREDNISolone (MEDROL DOSEPACK) 4 mg tablet; Follow pack instruction  -     guaiFENesin ER (MUCINEX) 600 mg ER tablet; Take 1 Tab by mouth two (2) times a day. Strongly encouraged to quit smoking. 4. Cough  -     methylPREDNISolone (MEDROL DOSEPACK) 4 mg tablet; Follow pack instruction  -     guaiFENesin ER (MUCINEX) 600 mg ER tablet; Take 1 Tab by mouth two (2) times a day. Medication risks/benefits/costs/interactions/alternatives discussed with patient. Advised patient to call back or return to office if symptoms worsen/change/persist. If patient cannot reach us or should anything more severe/urgent arise he/she should proceed directly to the nearest emergency department. Discussed expected course/resolution/complications of diagnosis in detail with patient. Patient given a written after visit summary which includes her diagnoses, current medications and vitals. Patient expressed understanding with the diagnosis and plan. Follow-up and Dispositions    · Return in about 1 month (around 9/16/2019) for welcome to medicare .

## 2019-08-16 NOTE — PROGRESS NOTES
[place] Or is a 71 y.o. male    Chief Complaint   Patient presents with    Diabetes     a1c    Blood Pressure Check         1. Have you been to the ER, urgent care clinic since your last visit? Hospitalized since your last visit? No    2. Have you seen or consulted any other health care providers outside of the [address] [place] [address] since your last visit? Include any pap smears or colon screening. No    No flowsheet data found.      Health Maintenance Due   Topic Date Due    LIPID PANEL Q1  1955    EYE EXAM RETINAL OR DILATED  07/04/1964    DTaP/Tdap/Td series (1 - Tdap) 07/04/1975    Shingrix Vaccine Age 50> (1 of 2) 07/04/2004    FOBT Q 1 YEAR AGE 50-75  10/06/2016    FOOT EXAM Q1  01/15/2017    GLAUCOMA SCREENING Q2Y  07/04/2019    Pneumococcal 65+ years (2 of 2 - PPSV23) 07/04/2019    Influenza Age 9 to Adult  08/01/2019    MICROALBUMIN Q1  09/04/2019

## 2019-10-21 DIAGNOSIS — R80.9 TYPE 2 DIABETES MELLITUS WITH MICROALBUMINURIA, WITH LONG-TERM CURRENT USE OF INSULIN (HCC): ICD-10-CM

## 2019-10-21 DIAGNOSIS — E11.29 TYPE 2 DIABETES MELLITUS WITH MICROALBUMINURIA, WITH LONG-TERM CURRENT USE OF INSULIN (HCC): ICD-10-CM

## 2019-10-21 DIAGNOSIS — Z79.4 TYPE 2 DIABETES MELLITUS WITH MICROALBUMINURIA, WITH LONG-TERM CURRENT USE OF INSULIN (HCC): ICD-10-CM

## 2019-10-21 DIAGNOSIS — I10 ESSENTIAL HYPERTENSION: ICD-10-CM

## 2019-10-21 RX ORDER — METFORMIN HYDROCHLORIDE 1000 MG/1
TABLET ORAL
Qty: 60 TAB | Refills: 2 | Status: SHIPPED | OUTPATIENT
Start: 2019-10-21 | End: 2019-10-21 | Stop reason: SDUPTHER

## 2019-10-21 RX ORDER — METFORMIN HYDROCHLORIDE 1000 MG/1
TABLET ORAL
Qty: 60 TAB | Refills: 2 | Status: SHIPPED | OUTPATIENT
Start: 2019-10-21 | End: 2020-07-06 | Stop reason: SDUPTHER

## 2019-10-21 RX ORDER — GLIPIZIDE 10 MG/1
10 TABLET ORAL 2 TIMES DAILY
Qty: 60 TAB | Refills: 2 | Status: SHIPPED | OUTPATIENT
Start: 2019-10-21 | End: 2020-07-06 | Stop reason: SDUPTHER

## 2019-10-21 RX ORDER — LISINOPRIL 40 MG/1
40 TABLET ORAL DAILY
Qty: 90 TAB | Refills: 0 | Status: SHIPPED | OUTPATIENT
Start: 2019-10-21 | End: 2020-07-06 | Stop reason: SDUPTHER

## 2019-10-21 RX ORDER — HYDROCHLOROTHIAZIDE 25 MG/1
25 TABLET ORAL DAILY
Qty: 90 TAB | Refills: 0 | Status: SHIPPED | OUTPATIENT
Start: 2019-10-21 | End: 2020-07-06 | Stop reason: SDUPTHER

## 2020-07-06 DIAGNOSIS — R80.9 TYPE 2 DIABETES MELLITUS WITH MICROALBUMINURIA, WITH LONG-TERM CURRENT USE OF INSULIN (HCC): ICD-10-CM

## 2020-07-06 DIAGNOSIS — E11.29 TYPE 2 DIABETES MELLITUS WITH MICROALBUMINURIA, WITH LONG-TERM CURRENT USE OF INSULIN (HCC): ICD-10-CM

## 2020-07-06 DIAGNOSIS — I10 ESSENTIAL HYPERTENSION: ICD-10-CM

## 2020-07-06 DIAGNOSIS — Z79.4 TYPE 2 DIABETES MELLITUS WITH MICROALBUMINURIA, WITH LONG-TERM CURRENT USE OF INSULIN (HCC): ICD-10-CM

## 2020-07-08 RX ORDER — GLIPIZIDE 10 MG/1
10 TABLET ORAL 2 TIMES DAILY
Qty: 60 TAB | Refills: 2 | Status: SHIPPED | OUTPATIENT
Start: 2020-07-08 | End: 2021-01-11 | Stop reason: SDUPTHER

## 2020-07-08 RX ORDER — LISINOPRIL 40 MG/1
40 TABLET ORAL DAILY
Qty: 90 TAB | Refills: 0 | Status: SHIPPED | OUTPATIENT
Start: 2020-07-08 | End: 2021-01-11 | Stop reason: SDUPTHER

## 2020-07-08 RX ORDER — HYDROCHLOROTHIAZIDE 25 MG/1
25 TABLET ORAL DAILY
Qty: 90 TAB | Refills: 0 | Status: SHIPPED | OUTPATIENT
Start: 2020-07-08 | End: 2021-01-11 | Stop reason: SDUPTHER

## 2020-07-08 RX ORDER — METFORMIN HYDROCHLORIDE 1000 MG/1
TABLET ORAL
Qty: 60 TAB | Refills: 2 | Status: SHIPPED | OUTPATIENT
Start: 2020-07-08 | End: 2021-01-11 | Stop reason: SDUPTHER

## 2021-03-11 ENCOUNTER — OFFICE VISIT (OUTPATIENT)
Dept: PRIMARY CARE CLINIC | Age: 66
End: 2021-03-11
Payer: MEDICARE

## 2021-03-11 VITALS
HEART RATE: 89 BPM | DIASTOLIC BLOOD PRESSURE: 85 MMHG | OXYGEN SATURATION: 98 % | RESPIRATION RATE: 18 BRPM | WEIGHT: 188.4 LBS | SYSTOLIC BLOOD PRESSURE: 135 MMHG | BODY MASS INDEX: 29.57 KG/M2 | TEMPERATURE: 98.1 F | HEIGHT: 67 IN

## 2021-03-11 DIAGNOSIS — Z95.820 S/P ANGIOPLASTY WITH STENT: ICD-10-CM

## 2021-03-11 DIAGNOSIS — E11.29 TYPE 2 DIABETES MELLITUS WITH MICROALBUMINURIA, WITH LONG-TERM CURRENT USE OF INSULIN (HCC): Primary | ICD-10-CM

## 2021-03-11 DIAGNOSIS — I10 ESSENTIAL HYPERTENSION: ICD-10-CM

## 2021-03-11 DIAGNOSIS — I25.119 CORONARY ARTERY DISEASE INVOLVING NATIVE CORONARY ARTERY OF NATIVE HEART WITH ANGINA PECTORIS (HCC): ICD-10-CM

## 2021-03-11 DIAGNOSIS — Z79.4 TYPE 2 DIABETES MELLITUS WITH MICROALBUMINURIA, WITH LONG-TERM CURRENT USE OF INSULIN (HCC): Primary | ICD-10-CM

## 2021-03-11 DIAGNOSIS — R80.9 TYPE 2 DIABETES MELLITUS WITH MICROALBUMINURIA, WITH LONG-TERM CURRENT USE OF INSULIN (HCC): Primary | ICD-10-CM

## 2021-03-11 LAB
ANION GAP SERPL CALC-SCNC: 8 MMOL/L (ref 5–15)
BASOPHILS # BLD: 0.1 K/UL (ref 0–0.1)
BASOPHILS NFR BLD: 1 % (ref 0–1)
BUN SERPL-MCNC: 22 MG/DL (ref 6–20)
BUN/CREAT SERPL: 16 (ref 12–20)
CALCIUM SERPL-MCNC: 9.8 MG/DL (ref 8.5–10.1)
CHLORIDE SERPL-SCNC: 104 MMOL/L (ref 97–108)
CO2 SERPL-SCNC: 24 MMOL/L (ref 21–32)
CREAT SERPL-MCNC: 1.36 MG/DL (ref 0.7–1.3)
DIFFERENTIAL METHOD BLD: NORMAL
EOSINOPHIL # BLD: 0.1 K/UL (ref 0–0.4)
EOSINOPHIL NFR BLD: 2 % (ref 0–7)
ERYTHROCYTE [DISTWIDTH] IN BLOOD BY AUTOMATED COUNT: 13.6 % (ref 11.5–14.5)
GLUCOSE SERPL-MCNC: 133 MG/DL (ref 65–100)
HBA1C MFR BLD HPLC: 7 %
HCT VFR BLD AUTO: 42.4 % (ref 36.6–50.3)
HGB BLD-MCNC: 14.1 G/DL (ref 12.1–17)
IMM GRANULOCYTES # BLD AUTO: 0 K/UL (ref 0–0.04)
IMM GRANULOCYTES NFR BLD AUTO: 0 % (ref 0–0.5)
LYMPHOCYTES # BLD: 1.6 K/UL (ref 0.8–3.5)
LYMPHOCYTES NFR BLD: 21 % (ref 12–49)
MAGNESIUM SERPL-MCNC: 2 MG/DL (ref 1.6–2.4)
MCH RBC QN AUTO: 31.8 PG (ref 26–34)
MCHC RBC AUTO-ENTMCNC: 33.3 G/DL (ref 30–36.5)
MCV RBC AUTO: 95.5 FL (ref 80–99)
MONOCYTES # BLD: 0.5 K/UL (ref 0–1)
MONOCYTES NFR BLD: 6 % (ref 5–13)
NEUTS SEG # BLD: 5.5 K/UL (ref 1.8–8)
NEUTS SEG NFR BLD: 70 % (ref 32–75)
NRBC # BLD: 0 K/UL (ref 0–0.01)
NRBC BLD-RTO: 0 PER 100 WBC
PLATELET # BLD AUTO: 236 K/UL (ref 150–400)
PMV BLD AUTO: 11.3 FL (ref 8.9–12.9)
POTASSIUM SERPL-SCNC: 3.9 MMOL/L (ref 3.5–5.1)
RBC # BLD AUTO: 4.44 M/UL (ref 4.1–5.7)
SODIUM SERPL-SCNC: 136 MMOL/L (ref 136–145)
WBC # BLD AUTO: 7.9 K/UL (ref 4.1–11.1)

## 2021-03-11 PROCEDURE — 3051F HG A1C>EQUAL 7.0%<8.0%: CPT | Performed by: FAMILY MEDICINE

## 2021-03-11 PROCEDURE — G8536 NO DOC ELDER MAL SCRN: HCPCS | Performed by: FAMILY MEDICINE

## 2021-03-11 PROCEDURE — 83036 HEMOGLOBIN GLYCOSYLATED A1C: CPT | Performed by: FAMILY MEDICINE

## 2021-03-11 PROCEDURE — 3017F COLORECTAL CA SCREEN DOC REV: CPT | Performed by: FAMILY MEDICINE

## 2021-03-11 PROCEDURE — G8754 DIAS BP LESS 90: HCPCS | Performed by: FAMILY MEDICINE

## 2021-03-11 PROCEDURE — 2022F DILAT RTA XM EVC RTNOPTHY: CPT | Performed by: FAMILY MEDICINE

## 2021-03-11 PROCEDURE — 99214 OFFICE O/P EST MOD 30 MIN: CPT | Performed by: FAMILY MEDICINE

## 2021-03-11 PROCEDURE — G8419 CALC BMI OUT NRM PARAM NOF/U: HCPCS | Performed by: FAMILY MEDICINE

## 2021-03-11 PROCEDURE — G8427 DOCREV CUR MEDS BY ELIG CLIN: HCPCS | Performed by: FAMILY MEDICINE

## 2021-03-11 PROCEDURE — G8752 SYS BP LESS 140: HCPCS | Performed by: FAMILY MEDICINE

## 2021-03-11 PROCEDURE — G8432 DEP SCR NOT DOC, RNG: HCPCS | Performed by: FAMILY MEDICINE

## 2021-03-11 PROCEDURE — 1101F PT FALLS ASSESS-DOCD LE1/YR: CPT | Performed by: FAMILY MEDICINE

## 2021-03-11 RX ORDER — LISINOPRIL 40 MG/1
40 TABLET ORAL DAILY
Qty: 90 TAB | Refills: 0 | Status: SHIPPED | OUTPATIENT
Start: 2021-03-11 | End: 2021-09-01 | Stop reason: SDUPTHER

## 2021-03-11 RX ORDER — HYDROCHLOROTHIAZIDE 25 MG/1
25 TABLET ORAL DAILY
Qty: 90 TAB | Refills: 1 | Status: SHIPPED | OUTPATIENT
Start: 2021-03-11 | End: 2021-09-01 | Stop reason: SDUPTHER

## 2021-03-11 RX ORDER — GLIPIZIDE 10 MG/1
10 TABLET ORAL 2 TIMES DAILY
Qty: 60 TAB | Refills: 2 | Status: SHIPPED | OUTPATIENT
Start: 2021-03-11 | End: 2021-09-01 | Stop reason: SDUPTHER

## 2021-03-11 RX ORDER — METFORMIN HYDROCHLORIDE 1000 MG/1
TABLET ORAL
Qty: 60 TAB | Refills: 2 | Status: SHIPPED | OUTPATIENT
Start: 2021-03-11 | End: 2021-09-03

## 2021-03-11 RX ORDER — CLOPIDOGREL BISULFATE 75 MG/1
TABLET ORAL
COMMUNITY
Start: 2021-03-01

## 2021-03-11 NOTE — PROGRESS NOTES
Sis Ramírez is a 76 y.o. male     Chief Complaint   Patient presents with    Diabetes    Medication Refill    Labs    Hypertension     1. Have you been to the ER, urgent care clinic since your last visit? Hospitalized since your last visit? No    2. Have you seen or consulted any other health care providers outside of the [address] [place] [address] since your last visit? Include any pap smears or colon screening. Yes When: Patient saw Cardiologist, Dr. Uzair Jorgensen, on 2/22/2021.      Visit Vitals  Temp 98.1 °F (36.7 °C) (Temporal)   Ht 5' 7\" (1.702 m)   Wt 188 lb 6.4 oz (85.5 kg)   BMI 29.51 kg/m²

## 2021-03-11 NOTE — PROGRESS NOTES
Subjective:     Chief Complaint   Patient presents with    Diabetes    Medication Refill    Labs    Hypertension        He  is a 76y.o. year old male with h/o DM, HTN who presents today  for follow up on his chronic condition as well as with acute concerns. He is here after almost two years. Patient reports that he had mild heart attack last year followed by one stent. He was hospitalized for a day in [facility]. He was started on Plavix. Reports that his visit with Dr. Jeet Westfall was three weeks ago.         DM-2:  He is on metformin and Glipizide and Insulin.   He is currently on 5-7 units of Novolin 70/30 in AM and PM.  States that his FBS has been running in 90- lower 100s range and during supper its around 120s. last A1c was 7. 1.     HTN: BP is well controlled with Lisinopril 40 mg and HCTZ 25 mg.     He denies any chest pain, soa, cough, abdominal pain.               Pertinent items are noted in HPI. Objective:     Vitals:    03/11/21 0859 03/11/21 0923   BP: (!) 143/87 135/85   Pulse: 89    Resp: 18    Temp: 98.1 °F (36.7 °C)    TempSrc: Temporal    SpO2: 98%    Weight: 188 lb 6.4 oz (85.5 kg)    Height: 5' 7\" (1.702 m)        Physical Examination: General appearance - alert, well appearing, and in no distress, oriented to person, place, and time and overweight  Mental status - alert, oriented to person, place, and time, normal mood, behavior, speech, dress, motor activity, and thought processes  Chest - clear to auscultation, no wheezes, rales or rhonchi, symmetric air entry  Heart - normal rate, regular rhythm, normal S1, S2, no murmurs, rubs, clicks or gallops  Extremities - no pedal edema noted, feet normal, good pulses, normal color, temperature and sensation, monofilament sensory exam is normal in both feet. Long nails noted.      No Known Allergies   Social History     Socioeconomic History    Marital status:      Spouse name: Not on file    Number of children: Not on file    Years of education: Not on file    Highest education level: Not on file   Tobacco Use    Smoking status: Current Every Day Smoker     Packs/day: 0.25    Smokeless tobacco: Never Used   Substance and Sexual Activity    Alcohol use: No    Drug use: Yes     Types: Marijuana    Sexual activity: Yes      Family History   Problem Relation Age of Onset    Hypertension Mother     Stroke Mother       Past Surgical History:   Procedure Laterality Date    HX APPENDECTOMY      HX ORTHOPAEDIC      LEFT ankle fracture repair. Metal placed. Past Medical History:   Diagnosis Date    Diabetes ([address]     Hx of heart artery stent 05/2020    Hypertension     Other ill-defined conditions(799.89)     Diverticulitis      Current Outpatient Medications   Medication Sig Dispense Refill    clopidogreL (PLAVIX) 75 mg tab TAKE 1 TABLET BY MOUTH ONCE DAILY      metFORMIN (GLUCOPHAGE) 1,000 mg tablet TAKE 1 TABLET BY MOUTH TWICE DAILY WITH MEALS FOR DIABETES. Need appointment in office. 60 Tab 0    glipiZIDE (GLUCOTROL) 10 mg tablet Take 1 Tab by mouth two (2) times a day. Indications: type 2 diabetes mellitus 60 Tab 0    lisinopriL (PRINIVIL, ZESTRIL) 40 mg tablet Take 1 Tab by mouth daily. Need appointment in office. 30 Tab 0    hydroCHLOROthiazide (HYDRODIURIL) 25 mg tablet Take 1 Tab by mouth daily. Indications: high blood pressure 30 Tab 0    insulin NPH/insulin regular (NOVOLIN 70/30 U-100 INSULIN) 100 unit/mL (70-30) injection 6 Units by SubCUTAneous route two (2) times daily (with meals). 10 mL 2    guaiFENesin ER (MUCINEX) 600 mg ER tablet Take 1 Tab by mouth two (2) times a day. 20 Tab 0    Insulin Needles, Disposable, 31 gauge x 5/16\" ndle Use twice/day.  1 Package 11    Lancets (FREESTYLE LANCETS) misc Check fasting  and before dinner sugar 1 Each 11    glucose blood VI test strips (FREESTYLE LITE STRIPS) strip Check fasting  and before dinner sugar 100 Strip 11    Blood-Glucose Meter (FREESTYLE LITE METER) monitoring kit Check fasting  and before dinner sugar 1 Kit 0    methylPREDNISolone (MEDROL DOSEPACK) 4 mg tablet Follow pack instruction 1 Dose Pack 0        Assessment/ Plan:   Diagnoses and all orders for this visit:    1. Type 2 diabetes mellitus with microalbuminuria, with long-term current use of insulin ([facility])  -     AMB POC HEMOGLOBIN A1C  Last Point of Care HGB A1C  Hemoglobin A1c (POC)   Date Value Ref Range Status   03/11/2021 7.0 % Final      Well controlled. Continue current meds. -     LIPID PANEL; Future  -     METABOLIC PANEL, COMPREHENSIVE; Future  -     metFORMIN (GLUCOPHAGE) 1,000 mg tablet; TAKE 1 TABLET BY MOUTH TWICE DAILY WITH MEALS FOR DIABETES. -     insulin NPH/insulin regular (NovoLIN 70/30 U-100 Insulin) 100 unit/mL (70-30) injection; 6 Units by SubCUTAneous route two (2) times daily (with meals). -     glipiZIDE (GLUCOTROL) 10 mg tablet; Take 1 Tab by mouth two (2) times a day. Indications: type 2 diabetes mellitus    2. Essential hypertension  -     MICROALBUMIN, UR, RAND W/ MICROALB/CREAT RATIO; Future  -      DIABETES FOOT EXAM  -     LIPID PANEL; Future  -     METABOLIC PANEL, COMPREHENSIVE; Future  -     lisinopriL (PRINIVIL, ZESTRIL) 40 mg tablet; Take 1 Tab by mouth daily. -     hydroCHLOROthiazide (HYDRODIURIL) 25 mg tablet; Take 1 Tab by mouth daily. Indications: high blood pressure    3. S/P angioplasty with stent  -  Discussed that we will start stain . Will wait for lab results. LIPID PANEL; Future    4. Coronary artery disease involving native coronary artery of native heart with angina pectoris [facility])  Assessment & Plan: This condition is managed by Specialist.    Orders:  -     LIPID PANEL; Future     Advised for yearly eye check up. Medication risks/benefits/costs/interactions/alternatives discussed with patient.   Advised patient to call back or return to office if symptoms worsen/change/persist. If patient cannot reach us or should anything more severe/urgent arise he/she should proceed directly to the nearest emergency department. Discussed expected course/resolution/complications of diagnosis in detail with patient. Patient given a written after visit summary which includes her diagnoses, current medications and vitals. Patient expressed understanding with the diagnosis and plan. Follow-up and Dispositions    · Return in about 1 month (around 4/11/2021) for medicare isaura Youssef

## 2021-03-12 LAB
COMMENT, HOLDF: NORMAL
SAMPLES BEING HELD,HOLD: NORMAL

## 2021-03-16 DIAGNOSIS — E78.5 HYPERLIPIDEMIA LDL GOAL <70: Primary | ICD-10-CM

## 2021-03-16 RX ORDER — ATORVASTATIN CALCIUM 10 MG/1
10 TABLET, FILM COATED ORAL DAILY
Qty: 90 TAB | Refills: 0 | Status: SHIPPED | OUTPATIENT
Start: 2021-03-16 | End: 2022-05-17 | Stop reason: SDUPTHER

## 2021-04-07 ENCOUNTER — TELEPHONE (OUTPATIENT)
Dept: PRIMARY CARE CLINIC | Age: 66
End: 2021-04-07

## 2021-04-07 NOTE — TELEPHONE ENCOUNTER
----- Message from Ajit Jimenez MD sent at 3/16/2021  5:07 PM EDT -----  Please call patient:    Cholesterol level is not at goal. Recommending to start Lipitor 10 mg . Prescription sent pharmacy. This type of drug is usually highly effective to lower LDL cholesterol and is usually very well tolerated. However, statin-type drugs can potentially injure the liver. Blood tests will be required every 3 months for the first 6 months of therapy, and at 6-12 month intervals thereafter. Damage to the liver, if detected early, can be reversed by stopping the drug. Be alert for persistent nausea, abdominal pain, or yellow jaundice, and report such to me directly. Statin drugs may also cause skeletal muscle injury in rare cases. Be alert for pronounced persistent diffuse muscle pain and discontinue the drug immediately should such symptoms develop. Plan to make a lab appointment in 3 months to recheck your labs and follow up. Kidney function is abnormal but stable compare to last time.

## 2021-04-07 NOTE — LETTER
5/3/2021 4:34 PM 
 
Mr. Val Alvarado 
[address] [address] [phone] We've been trying to reach you, Please call the office at your  
earliest convenience. We may have the wrong telephone number in your chart. Please call [phone] Sincerely, 
 
 
Francois Suero MD

## 2021-09-01 ENCOUNTER — TELEPHONE (OUTPATIENT)
Dept: PRIMARY CARE CLINIC | Age: 66
End: 2021-09-01

## 2021-09-01 DIAGNOSIS — I10 ESSENTIAL HYPERTENSION: ICD-10-CM

## 2021-09-01 DIAGNOSIS — E11.29 TYPE 2 DIABETES MELLITUS WITH MICROALBUMINURIA, WITH LONG-TERM CURRENT USE OF INSULIN (HCC): ICD-10-CM

## 2021-09-01 DIAGNOSIS — R80.9 TYPE 2 DIABETES MELLITUS WITH MICROALBUMINURIA, WITH LONG-TERM CURRENT USE OF INSULIN (HCC): ICD-10-CM

## 2021-09-01 DIAGNOSIS — Z79.4 TYPE 2 DIABETES MELLITUS WITH MICROALBUMINURIA, WITH LONG-TERM CURRENT USE OF INSULIN (HCC): ICD-10-CM

## 2021-09-01 RX ORDER — LISINOPRIL 40 MG/1
40 TABLET ORAL DAILY
Qty: 90 TABLET | Refills: 0 | Status: SHIPPED | OUTPATIENT
Start: 2021-09-01 | End: 2022-03-11

## 2021-09-01 RX ORDER — HYDROCHLOROTHIAZIDE 25 MG/1
25 TABLET ORAL DAILY
Qty: 90 TABLET | Refills: 1 | Status: SHIPPED | OUTPATIENT
Start: 2021-09-01 | End: 2022-05-17 | Stop reason: SDUPTHER

## 2021-09-01 RX ORDER — GLIPIZIDE 10 MG/1
10 TABLET ORAL 2 TIMES DAILY
Qty: 60 TABLET | Refills: 2 | Status: SHIPPED | OUTPATIENT
Start: 2021-09-01 | End: 2022-01-10 | Stop reason: SDUPTHER

## 2021-09-01 NOTE — TELEPHONE ENCOUNTER
Patient said he is going out of town Monday, September 6 and he needs his medication before then. He would like to be able to pick them up no later than Saturday.

## 2021-09-01 NOTE — TELEPHONE ENCOUNTER
Requested Prescriptions     Pending Prescriptions Disp Refills    glipiZIDE (GLUCOTROL) 10 mg tablet 60 Tablet 2     Sig: Take 1 Tablet by mouth two (2) times a day. Indications: type 2 diabetes mellitus    hydroCHLOROthiazide (HYDRODIURIL) 25 mg tablet 90 Tablet 1     Sig: Take 1 Tablet by mouth daily. Indications: high blood pressure    lisinopriL (PRINIVIL, ZESTRIL) 40 mg tablet 90 Tablet 0     Sig: Take 1 Tablet by mouth daily.        Last office visit: 3/11/21  Last refills-   Atorvastatin 10 mg: 3/16/21  Hydrochlorothiazide: 3/11/21  Lisinopril: 3/11/21    He is due for a Medicare Wellness

## 2021-09-02 NOTE — TELEPHONE ENCOUNTER
Spoke with pt made appointment for medicare wellness visit for 10/4 pt stated he would need medication until then told him I will message the nurse to see about short refill until his appointment.

## 2021-09-03 DIAGNOSIS — E11.29 TYPE 2 DIABETES MELLITUS WITH MICROALBUMINURIA, WITH LONG-TERM CURRENT USE OF INSULIN (HCC): ICD-10-CM

## 2021-09-03 DIAGNOSIS — R80.9 TYPE 2 DIABETES MELLITUS WITH MICROALBUMINURIA, WITH LONG-TERM CURRENT USE OF INSULIN (HCC): ICD-10-CM

## 2021-09-03 DIAGNOSIS — Z79.4 TYPE 2 DIABETES MELLITUS WITH MICROALBUMINURIA, WITH LONG-TERM CURRENT USE OF INSULIN (HCC): ICD-10-CM

## 2021-09-03 RX ORDER — METFORMIN HYDROCHLORIDE 1000 MG/1
TABLET ORAL
Qty: 60 TABLET | Refills: 0 | Status: SHIPPED | OUTPATIENT
Start: 2021-09-03 | End: 2021-11-10

## 2021-11-10 DIAGNOSIS — Z79.4 TYPE 2 DIABETES MELLITUS WITH MICROALBUMINURIA, WITH LONG-TERM CURRENT USE OF INSULIN (HCC): ICD-10-CM

## 2021-11-10 DIAGNOSIS — R80.9 TYPE 2 DIABETES MELLITUS WITH MICROALBUMINURIA, WITH LONG-TERM CURRENT USE OF INSULIN (HCC): ICD-10-CM

## 2021-11-10 DIAGNOSIS — E11.29 TYPE 2 DIABETES MELLITUS WITH MICROALBUMINURIA, WITH LONG-TERM CURRENT USE OF INSULIN (HCC): ICD-10-CM

## 2021-11-10 RX ORDER — METFORMIN HYDROCHLORIDE 1000 MG/1
TABLET ORAL
Qty: 60 TABLET | Refills: 0 | Status: SHIPPED | OUTPATIENT
Start: 2021-11-10 | End: 2022-01-10 | Stop reason: SDUPTHER

## 2022-01-10 DIAGNOSIS — R80.9 TYPE 2 DIABETES MELLITUS WITH MICROALBUMINURIA, WITH LONG-TERM CURRENT USE OF INSULIN (HCC): ICD-10-CM

## 2022-01-10 DIAGNOSIS — Z79.4 TYPE 2 DIABETES MELLITUS WITH MICROALBUMINURIA, WITH LONG-TERM CURRENT USE OF INSULIN (HCC): ICD-10-CM

## 2022-01-10 DIAGNOSIS — E11.29 TYPE 2 DIABETES MELLITUS WITH MICROALBUMINURIA, WITH LONG-TERM CURRENT USE OF INSULIN (HCC): ICD-10-CM

## 2022-01-10 RX ORDER — GLIPIZIDE 10 MG/1
10 TABLET ORAL 2 TIMES DAILY
Qty: 60 TABLET | Refills: 0 | Status: SHIPPED | OUTPATIENT
Start: 2022-01-10 | End: 2022-03-11

## 2022-01-10 RX ORDER — METFORMIN HYDROCHLORIDE 1000 MG/1
TABLET ORAL
Qty: 60 TABLET | Refills: 0 | Status: SHIPPED | OUTPATIENT
Start: 2022-01-10 | End: 2022-03-11

## 2022-01-10 NOTE — TELEPHONE ENCOUNTER
Requested Prescriptions     Pending Prescriptions Disp Refills    glipiZIDE (GLUCOTROL) 10 mg tablet 60 Tablet 2     Sig: Take 1 Tablet by mouth two (2) times a day.  Indications: type 2 diabetes mellitus    metFORMIN (GLUCOPHAGE) 1,000 mg tablet 60 Tablet 0        Last Visit 03/11/21  Last Refill 11/10/21- metformin  09/01/21- glipizide

## 2022-03-18 PROBLEM — I10 ESSENTIAL HYPERTENSION: Status: ACTIVE | Noted: 2018-09-04

## 2022-03-18 PROBLEM — I25.119 CORONARY ARTERY DISEASE INVOLVING NATIVE CORONARY ARTERY OF NATIVE HEART WITH ANGINA PECTORIS (HCC): Status: ACTIVE | Noted: 2021-03-11

## 2022-03-19 PROBLEM — Z95.820 S/P ANGIOPLASTY WITH STENT: Status: ACTIVE | Noted: 2021-03-11

## 2022-05-10 DIAGNOSIS — R80.9 TYPE 2 DIABETES MELLITUS WITH MICROALBUMINURIA, WITH LONG-TERM CURRENT USE OF INSULIN (HCC): ICD-10-CM

## 2022-05-10 DIAGNOSIS — Z79.4 TYPE 2 DIABETES MELLITUS WITH MICROALBUMINURIA, WITH LONG-TERM CURRENT USE OF INSULIN (HCC): ICD-10-CM

## 2022-05-10 DIAGNOSIS — E11.29 TYPE 2 DIABETES MELLITUS WITH MICROALBUMINURIA, WITH LONG-TERM CURRENT USE OF INSULIN (HCC): ICD-10-CM

## 2022-05-11 ENCOUNTER — TELEPHONE (OUTPATIENT)
Dept: PRIMARY CARE CLINIC | Age: 67
End: 2022-05-11

## 2022-05-11 NOTE — TELEPHONE ENCOUNTER
Called patient to schedule a New to Provider appointment but there was no room on patients answer machine to leave a message

## 2022-05-12 DIAGNOSIS — Z79.4 TYPE 2 DIABETES MELLITUS WITH MICROALBUMINURIA, WITH LONG-TERM CURRENT USE OF INSULIN (HCC): ICD-10-CM

## 2022-05-12 DIAGNOSIS — E11.29 TYPE 2 DIABETES MELLITUS WITH MICROALBUMINURIA, WITH LONG-TERM CURRENT USE OF INSULIN (HCC): ICD-10-CM

## 2022-05-12 DIAGNOSIS — R80.9 TYPE 2 DIABETES MELLITUS WITH MICROALBUMINURIA, WITH LONG-TERM CURRENT USE OF INSULIN (HCC): ICD-10-CM

## 2022-05-12 RX ORDER — METFORMIN HYDROCHLORIDE 1000 MG/1
TABLET ORAL
Qty: 60 TABLET | Refills: 0 | OUTPATIENT
Start: 2022-05-12

## 2022-05-12 NOTE — TELEPHONE ENCOUNTER
Patient has a NTP appointment with Dr. Arminda Abraham 5/17/22 at 2:15. Patient said he only has 4 pills left. Patient asked if Dr. Arminda Abraham can please refill his prescription at least until his appointment date.

## 2022-05-12 NOTE — TELEPHONE ENCOUNTER
Patient mailbox is full, unable to leave a message. Patient will need to be seen for medication refills.

## 2022-05-13 ENCOUNTER — TELEPHONE (OUTPATIENT)
Dept: PRIMARY CARE CLINIC | Age: 67
End: 2022-05-13

## 2022-05-13 RX ORDER — METFORMIN HYDROCHLORIDE 1000 MG/1
TABLET ORAL
Qty: 60 TABLET | Refills: 0 | Status: SHIPPED | OUTPATIENT
Start: 2022-05-13 | End: 2022-05-17 | Stop reason: SDUPTHER

## 2022-05-13 NOTE — TELEPHONE ENCOUNTER
Patient calling to speak with the nurse about his metformin medication.  Please call patient to discuss

## 2022-05-17 ENCOUNTER — OFFICE VISIT (OUTPATIENT)
Dept: PRIMARY CARE CLINIC | Age: 67
End: 2022-05-17
Payer: MEDICARE

## 2022-05-17 VITALS
BODY MASS INDEX: 28.94 KG/M2 | TEMPERATURE: 97.5 F | WEIGHT: 184.4 LBS | HEIGHT: 67 IN | HEART RATE: 86 BPM | DIASTOLIC BLOOD PRESSURE: 84 MMHG | SYSTOLIC BLOOD PRESSURE: 143 MMHG | RESPIRATION RATE: 18 BRPM | OXYGEN SATURATION: 97 %

## 2022-05-17 DIAGNOSIS — E78.5 HYPERLIPIDEMIA LDL GOAL <70: ICD-10-CM

## 2022-05-17 DIAGNOSIS — Z12.5 PROSTATE CANCER SCREENING: ICD-10-CM

## 2022-05-17 DIAGNOSIS — I10 ESSENTIAL HYPERTENSION: ICD-10-CM

## 2022-05-17 DIAGNOSIS — R80.9 TYPE 2 DIABETES MELLITUS WITH MICROALBUMINURIA, WITH LONG-TERM CURRENT USE OF INSULIN (HCC): Primary | ICD-10-CM

## 2022-05-17 DIAGNOSIS — F17.200 SMOKING: ICD-10-CM

## 2022-05-17 DIAGNOSIS — Z79.4 TYPE 2 DIABETES MELLITUS WITH MICROALBUMINURIA, WITH LONG-TERM CURRENT USE OF INSULIN (HCC): Primary | ICD-10-CM

## 2022-05-17 DIAGNOSIS — E11.29 TYPE 2 DIABETES MELLITUS WITH MICROALBUMINURIA, WITH LONG-TERM CURRENT USE OF INSULIN (HCC): Primary | ICD-10-CM

## 2022-05-17 DIAGNOSIS — H72.91 PERFORATION OF RIGHT TYMPANIC MEMBRANE: ICD-10-CM

## 2022-05-17 DIAGNOSIS — I25.119 CORONARY ARTERY DISEASE INVOLVING NATIVE CORONARY ARTERY OF NATIVE HEART WITH ANGINA PECTORIS (HCC): ICD-10-CM

## 2022-05-17 DIAGNOSIS — F51.01 PRIMARY INSOMNIA: ICD-10-CM

## 2022-05-17 PROCEDURE — 2022F DILAT RTA XM EVC RTNOPTHY: CPT | Performed by: INTERNAL MEDICINE

## 2022-05-17 PROCEDURE — 1101F PT FALLS ASSESS-DOCD LE1/YR: CPT | Performed by: INTERNAL MEDICINE

## 2022-05-17 PROCEDURE — 99214 OFFICE O/P EST MOD 30 MIN: CPT | Performed by: INTERNAL MEDICINE

## 2022-05-17 PROCEDURE — G8419 CALC BMI OUT NRM PARAM NOF/U: HCPCS | Performed by: INTERNAL MEDICINE

## 2022-05-17 PROCEDURE — 3017F COLORECTAL CA SCREEN DOC REV: CPT | Performed by: INTERNAL MEDICINE

## 2022-05-17 PROCEDURE — G8536 NO DOC ELDER MAL SCRN: HCPCS | Performed by: INTERNAL MEDICINE

## 2022-05-17 PROCEDURE — G8754 DIAS BP LESS 90: HCPCS | Performed by: INTERNAL MEDICINE

## 2022-05-17 PROCEDURE — G8753 SYS BP > OR = 140: HCPCS | Performed by: INTERNAL MEDICINE

## 2022-05-17 PROCEDURE — G8427 DOCREV CUR MEDS BY ELIG CLIN: HCPCS | Performed by: INTERNAL MEDICINE

## 2022-05-17 PROCEDURE — 3046F HEMOGLOBIN A1C LEVEL >9.0%: CPT | Performed by: INTERNAL MEDICINE

## 2022-05-17 PROCEDURE — G8510 SCR DEP NEG, NO PLAN REQD: HCPCS | Performed by: INTERNAL MEDICINE

## 2022-05-17 RX ORDER — LISINOPRIL 40 MG/1
40 TABLET ORAL DAILY
Qty: 90 TABLET | Refills: 1 | Status: SHIPPED | OUTPATIENT
Start: 2022-05-17

## 2022-05-17 RX ORDER — GLIPIZIDE 10 MG/1
10 TABLET ORAL 2 TIMES DAILY
Qty: 180 TABLET | Refills: 1 | Status: SHIPPED | OUTPATIENT
Start: 2022-05-17

## 2022-05-17 RX ORDER — ATORVASTATIN CALCIUM 10 MG/1
10 TABLET, FILM COATED ORAL DAILY
Qty: 90 TABLET | Refills: 0 | Status: SHIPPED | OUTPATIENT
Start: 2022-05-17

## 2022-05-17 RX ORDER — TRAZODONE HYDROCHLORIDE 50 MG/1
50 TABLET ORAL
Qty: 90 TABLET | Refills: 1 | Status: SHIPPED | OUTPATIENT
Start: 2022-05-17

## 2022-05-17 RX ORDER — METFORMIN HYDROCHLORIDE 1000 MG/1
TABLET ORAL
Qty: 180 TABLET | Refills: 1 | Status: SHIPPED | OUTPATIENT
Start: 2022-05-17

## 2022-05-17 RX ORDER — HYDROCHLOROTHIAZIDE 25 MG/1
25 TABLET ORAL DAILY
Qty: 90 TABLET | Refills: 1 | Status: SHIPPED | OUTPATIENT
Start: 2022-05-17

## 2022-05-17 NOTE — PROGRESS NOTES
Chief Complaint   Patient presents with    [facility]    Diabetes     BS 41    Hypertension        Visit Vitals  BP (!) 143/84 (BP 1 Location: Right upper arm, BP Patient Position: Sitting)   Pulse 86   Temp 97.5 °F (36.4 °C) (Temporal)   Resp 18   Ht 5' 7\" (1.702 m)   Wt 184 lb 6.4 oz (83.6 kg)   SpO2 97%   BMI 28.88 kg/m²        Health Maintenance Due   Topic    Depression Screen     COVID-19 Vaccine (1)    Eye Exam Retinal or Dilated     DTaP/Tdap/Td series (1 - Tdap)    Shingrix Vaccine Age 49> (1 of 2)    Pneumococcal 65+ years (2 - PPSV23 or PCV20)    Colorectal Cancer Screening Combo     Medicare Yearly Exam     Foot Exam Q1     A1C test (Diabetic or Prediabetic)     MICROALBUMIN Q1     Lipid Screen         1. Have you been to the ER, urgent care clinic since your last visit? Hospitalized since your last visit? No    2. Have you seen or consulted any other health care providers outside of the [address] [place] [address] since your last visit? Include any pap smears or colon screening.  No

## 2022-05-17 NOTE — PROGRESS NOTES
Ryanne Lepe is a 78 y.o.  male and presents with     Chief Complaint   Patient presents with    Establish Care    Diabetes     BS 39    Hypertension     Pt is here to establish care  Pt  Takes insulin 7 units at bedtime  Sometimes feels loopy. Today his blood sugars were low. Nurse gave him glucose tablets. Pt smokes about 1 pack every three days. Pt does not have tinglin or numbness in feet. Pt retitred. Was . 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 Has h/o CAd s/p stent placement 2 years ago , was felt to be doing fine by cardiology. Does not drink alcohol. ' , 4 children and 4 grand kids. One daughter and son live in [place]  colonscopy 5 years ago was normal.  Pt had rt ear surgery . 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 Has balance problems            Past Medical History:   Diagnosis Date    Diabetes ([address]     Hx of heart artery stent 05/2020    Hypertension     Other ill-defined conditions(799.89)     Diverticulitis     Past Surgical History:   Procedure Laterality Date    HX APPENDECTOMY      HX ORTHOPAEDIC      LEFT ankle fracture repair. Metal placed. Current Outpatient Medications   Medication Sig    metFORMIN (GLUCOPHAGE) 1,000 mg tablet TAKE 1 TABLET BY MOUTH TWICE DAILY WITH FOOD FOR DIABETES. Must need appointment.  lisinopriL (PRINIVIL, ZESTRIL) 40 mg tablet Take 1 Tablet by mouth daily.  glipiZIDE (GLUCOTROL) 10 mg tablet Take 1 Tablet by mouth two (2) times a day.  hydroCHLOROthiazide (HYDRODIURIL) 25 mg tablet Take 1 Tablet by mouth daily. Indications: high blood pressure    atorvastatin (LIPITOR) 10 mg tablet Take 1 Tablet by mouth daily.  traZODone (DESYREL) 50 mg tablet Take 1 Tablet by mouth nightly.  clopidogreL (PLAVIX) 75 mg tab TAKE 1 TABLET BY MOUTH ONCE DAILY    insulin NPH/insulin regular (NovoLIN 70/30 U-100 Insulin) 100 unit/mL (70-30) injection 6 Units by SubCUTAneous route two (2) times daily (with meals).     Insulin Needles, Disposable, 31 gauge x 5/16\" ndle Use twice/day.  Lancets (FREESTYLE LANCETS) misc Check fasting  and before dinner sugar    glucose blood VI test strips (FREESTYLE LITE STRIPS) strip Check fasting  and before dinner sugar    Blood-Glucose Meter (FREESTYLE LITE METER) monitoring kit Check fasting  and before dinner sugar     No current facility-administered medications for this visit. Health Maintenance   Topic Date Due    Depression Screen  Never done    COVID-19 Vaccine (1) Never done    Eye Exam Retinal or Dilated  Never done    DTaP/Tdap/Td series (1 - Tdap) Never done    Shingrix Vaccine Age 50> (1 of 2) Never done    Pneumococcal 65+ years (2 - PPSV23 or PCV20) 09/22/2016    Colorectal Cancer Screening Combo  10/06/2016    Medicare Yearly Exam  Never done    Foot Exam Q1  03/11/2022    A1C test (Diabetic or Prediabetic)  03/11/2022    MICROALBUMIN Q1  03/11/2022    Lipid Screen  03/11/2022    Flu Vaccine (Season Ended) 09/01/2022    Hepatitis C Screening  Addressed     Immunization History   Administered Date(s) Administered    Influenza Vaccine (>6 mo Afluria QUAD Vial [id number] (0.25 mL) / 87771 (0.5 mL)) 09/22/2015    Pneumococcal Conjugate (PCV-13) 09/22/2015     No LMP for male patient. Allergies and Intolerances:   No Known Allergies    Family History:   Family History   Problem Relation Age of Onset    Hypertension Mother     Stroke Mother        Social History:   He  reports that he has been smoking cigarettes. He started smoking about 51 years ago. He has a 12.75 pack-year smoking history. He has never used smokeless tobacco.  He  reports no history of alcohol use.             Review of Systems:   General: negative for - chills, fatigue, fever, weight change  Psych: negative for - anxiety, depression, irritability or mood swings  ENT: negative for - headaches, hearing change, nasal congestion, oral lesions, sneezing or sore throat  Heme/ Lymph: negative for - bleeding problems, bruising, pallor or swollen lymph nodes  Endo: negative for - hot flashes, polydipsia/polyuria or temperature intolerance  Resp: negative for - cough, shortness of breath or wheezing  CV: negative for - chest pain, edema or palpitations  GI: negative for - abdominal pain, change in bowel habits, constipation, diarrhea or nausea/vomiting  : negative for - dysuria, hematuria, incontinence, pelvic pain or vulvar/vaginal symptoms  MSK: negative for - joint pain, joint swelling or muscle pain  Neuro: negative for - confusion, headaches, seizures or weakness  Derm: negative for - dry skin, hair changes, rash or skin lesion changes          Physical:   Vitals:   Vitals:    05/17/22 1412   BP: (!) 143/84   Pulse: 86   Resp: 18   Temp: 97.5 °F (36.4 °C)   TempSrc: Temporal   SpO2: 97%   Weight: 184 lb 6.4 oz (83.6 kg)   Height: 5' 7\" (1.702 m)           Exam:   HEENT- atraumatic,normocephalic, awake, oriented, well nourished , rt ear drum perforation  Neck - supple,no enlarged lymph nodes, no JVD, no thyromegaly  Chest- CTA, no rhonchi, no crackles  Heart- rrr, no murmurs / gallop/rub  Abdomen- soft,BS+,NT, no hepatosplenomegaly  Ext - no c/c/edema , clubbing  Neuro- no focal deficits. Power 5/5 all extremities  Skin - warm,dry, no obvious rashes.           Review of Data:   LABS:   Lab Results   Component Value Date/Time    WBC 7.9 03/11/2021 09:53 AM    HGB 14.1 03/11/2021 09:53 AM    HCT 42.4 03/11/2021 09:53 AM    PLATELET 073 91/25/5913 09:53 AM     Lab Results   Component Value Date/Time    Sodium 136 03/11/2021 09:53 AM    Sodium 136 03/11/2021 09:53 AM    Potassium 4.0 03/11/2021 09:53 AM    Potassium 3.9 03/11/2021 09:53 AM    Chloride 105 03/11/2021 09:53 AM    Chloride 104 03/11/2021 09:53 AM    CO2 24 03/11/2021 09:53 AM    CO2 24 03/11/2021 09:53 AM    Glucose 134 (H) 03/11/2021 09:53 AM    Glucose 133 (H) 03/11/2021 09:53 AM    BUN 22 (H) 03/11/2021 09:53 AM    BUN 22 (H) 03/11/2021 09:53 AM    Creatinine 1.35 (H) 03/11/2021 09:53 AM    Creatinine 1.36 (H) 03/11/2021 09:53 AM     Lab Results   Component Value Date/Time    Cholesterol, total 173 03/11/2021 09:53 AM    HDL Cholesterol 44 03/11/2021 09:53 AM    LDL, calculated 99.8 03/11/2021 09:53 AM    Triglyceride 146 03/11/2021 09:53 AM     No components found for: GPT        Impression / Plan:        ICD-10-CM ICD-9-CM    1. Type 2 diabetes mellitus with microalbuminuria, with long-term current use of insulin ([facility])  R54.33 198.83 METABOLIC PANEL, COMPREHENSIVE    R80.9 791.0 CBC WITH AUTOMATED DIFF    Z79.4 V58.67 LIPID PANEL      HEMOGLOBIN A1C WITH EAG      MICROALBUMIN, UR, RAND W/ MICROALB/CREAT RATIO      metFORMIN (GLUCOPHAGE) 1,000 mg tablet      glipiZIDE (GLUCOTROL) 10 mg tablet   2. Essential hypertension  I10 401.9 lisinopriL (PRINIVIL, ZESTRIL) 40 mg tablet      hydroCHLOROthiazide (HYDRODIURIL) 25 mg tablet   3. Coronary artery disease involving native coronary artery of native heart with angina pectoris ([facility]  I25.119 414.01      413.9    4. Prostate cancer screening  Z12.5 V76.44 PSA SCREENING (SCREENING)   5. Hyperlipidemia LDL goal <70  E78.5 272.4 atorvastatin (LIPITOR) 10 mg tablet   6. Primary insomnia  F51.01 307.42 traZODone (DESYREL) 50 mg tablet   7. Smoking  F17.200 305.1    8. Perforation of right tympanic membrane  H72.91 384.20      Advised patient to quit smoking. Hypoglycemia-was given sugar tablets in the office. Patient clinically doing fine. Asked patient to stop taking 7 units of insulin at bedtime. Explained to patient risk benefits of the medications. Advised patient to stop meds if having any side effects. Pt verbalized understanding of the instructions. I have discussed the diagnosis with the patient and the intended plan as seen in the above orders. The patient has received an after-visit summary and questions were answered concerning future plans.   I have discussed medication side effects and warnings with the patient as well. I have reviewed the plan of care with the patient, accepted their input and they are in agreement with the treatment goals. Reviewed plan of care. Patient has provided input and agrees with goals.         Beth Cha MD

## 2023-06-06 ENCOUNTER — TELEPHONE (OUTPATIENT)
Dept: PRIMARY CARE CLINIC | Facility: CLINIC | Age: 68
End: 2023-06-06

## 2023-06-06 NOTE — TELEPHONE ENCOUNTER
Patient needs refills on   Metformin  Glipizide  Lisinopril  Hydrochlorothiazide    Sent to [facility] in [place]    Patient has enough meds to get him to Friday.

## 2023-06-07 NOTE — TELEPHONE ENCOUNTER
Tried calling patient to inform him that he needs an appointment asap for medication refills.  Patient did not answer but a voicemail was left

## 2023-06-08 RX ORDER — LISINOPRIL 40 MG/1
TABLET ORAL
Qty: 30 TABLET | Refills: 0 | Status: SHIPPED | OUTPATIENT
Start: 2023-06-08

## 2023-06-08 RX ORDER — GLIPIZIDE 10 MG/1
TABLET ORAL
Qty: 60 TABLET | Refills: 0 | Status: SHIPPED | OUTPATIENT
Start: 2023-06-08

## 2023-06-08 RX ORDER — HYDROCHLOROTHIAZIDE 25 MG/1
TABLET ORAL
Qty: 30 TABLET | Refills: 0 | Status: SHIPPED | OUTPATIENT
Start: 2023-06-08

## 2023-06-08 NOTE — TELEPHONE ENCOUNTER
PCP: Deion Knight MD    Last Visit 5/17/2022   No future appointments. Requested Prescriptions     Pending Prescriptions Disp Refills    glipiZIDE (GLUCOTROL) 10 MG tablet [Pharmacy Med Name: glipiZIDE 10 MG Oral Tablet] 180 tablet 0     Sig: Take 1 tablet by mouth twice daily    metFORMIN (GLUCOPHAGE) 1000 MG tablet [Pharmacy Med Name: metFORMIN HCl 1000 MG Oral Tablet] 180 tablet 0     Sig: TAKE 1 TABLET BY MOUTH TWICE DAILY WITH FOOD FOR DIABETES .  APPOINTMENT REQUIRED FOR FUTURE REFILLS    hydroCHLOROthiazide (HYDRODIURIL) 25 MG tablet [Pharmacy Med Name: hydroCHLOROthiazide 25 MG Oral Tablet] 90 tablet 0     Sig: TAKE 1 TABLET BY MOUTH ONCE DAILY FOR HIGH BLOOD PRESSURE    lisinopril (PRINIVIL;ZESTRIL) 40 MG tablet [Pharmacy Med Name: Lisinopril 40 MG Oral Tablet] 90 tablet 0     Sig: Take 1 tablet by mouth once daily         Other Comments: Last Refill on all meds   05/17/22

## 2023-11-09 ENCOUNTER — TELEPHONE (OUTPATIENT)
Dept: PRIMARY CARE CLINIC | Facility: CLINIC | Age: 68
End: 2023-11-09

## 2023-11-09 ENCOUNTER — OFFICE VISIT (OUTPATIENT)
Dept: PRIMARY CARE CLINIC | Facility: CLINIC | Age: 68
End: 2023-11-09
Payer: MEDICARE

## 2023-11-09 VITALS
RESPIRATION RATE: 18 BRPM | OXYGEN SATURATION: 96 % | BODY MASS INDEX: 28.09 KG/M2 | DIASTOLIC BLOOD PRESSURE: 97 MMHG | SYSTOLIC BLOOD PRESSURE: 149 MMHG | TEMPERATURE: 97.1 F | HEART RATE: 51 BPM | WEIGHT: 179 LBS | HEIGHT: 67 IN

## 2023-11-09 DIAGNOSIS — I10 ESSENTIAL (PRIMARY) HYPERTENSION: ICD-10-CM

## 2023-11-09 DIAGNOSIS — K57.30 DIVERTICULOSIS OF COLON: ICD-10-CM

## 2023-11-09 DIAGNOSIS — Z12.11 COLON CANCER SCREENING: ICD-10-CM

## 2023-11-09 DIAGNOSIS — E11.29 TYPE 2 DIABETES MELLITUS WITH OTHER DIABETIC KIDNEY COMPLICATION (HCC): ICD-10-CM

## 2023-11-09 DIAGNOSIS — Z12.5 PROSTATE CANCER SCREENING: ICD-10-CM

## 2023-11-09 DIAGNOSIS — E11.29 TYPE 2 DIABETES MELLITUS WITH MICROALBUMINURIA, UNSPECIFIED WHETHER LONG TERM INSULIN USE (HCC): ICD-10-CM

## 2023-11-09 DIAGNOSIS — F41.9 ANXIETY: ICD-10-CM

## 2023-11-09 DIAGNOSIS — R53.83 OTHER FATIGUE: ICD-10-CM

## 2023-11-09 DIAGNOSIS — R80.9 TYPE 2 DIABETES MELLITUS WITH MICROALBUMINURIA, UNSPECIFIED WHETHER LONG TERM INSULIN USE (HCC): ICD-10-CM

## 2023-11-09 DIAGNOSIS — I10 ESSENTIAL HYPERTENSION: ICD-10-CM

## 2023-11-09 DIAGNOSIS — Z00.00 MEDICARE ANNUAL WELLNESS VISIT, INITIAL: Primary | ICD-10-CM

## 2023-11-09 DIAGNOSIS — I25.119 ATHEROSCLEROSIS OF NATIVE CORONARY ARTERY OF NATIVE HEART WITH ANGINA PECTORIS (HCC): ICD-10-CM

## 2023-11-09 LAB
ALBUMIN SERPL-MCNC: 4.1 G/DL (ref 3.5–5)
ALBUMIN/GLOB SERPL: 1.1 (ref 1.1–2.2)
ALP SERPL-CCNC: 79 U/L (ref 45–117)
ALT SERPL-CCNC: 47 U/L (ref 12–78)
ANION GAP SERPL CALC-SCNC: 8 MMOL/L (ref 5–15)
AST SERPL-CCNC: 23 U/L (ref 15–37)
BILIRUB SERPL-MCNC: 0.3 MG/DL (ref 0.2–1)
BUN SERPL-MCNC: 16 MG/DL (ref 6–20)
BUN/CREAT SERPL: 12 (ref 12–20)
CALCIUM SERPL-MCNC: 10.6 MG/DL (ref 8.5–10.1)
CHLORIDE SERPL-SCNC: 102 MMOL/L (ref 97–108)
CHOLEST SERPL-MCNC: 176 MG/DL
CO2 SERPL-SCNC: 25 MMOL/L (ref 21–32)
CREAT SERPL-MCNC: 1.3 MG/DL (ref 0.7–1.3)
ERYTHROCYTE [DISTWIDTH] IN BLOOD BY AUTOMATED COUNT: 12.3 % (ref 11.5–14.5)
EST. AVERAGE GLUCOSE BLD GHB EST-MCNC: 166 MG/DL
GLOBULIN SER CALC-MCNC: 3.7 G/DL (ref 2–4)
GLUCOSE SERPL-MCNC: 233 MG/DL (ref 65–100)
HBA1C MFR BLD: 7.4 % (ref 4–5.6)
HCT VFR BLD AUTO: 46.7 % (ref 36.6–50.3)
HDLC SERPL-MCNC: 42 MG/DL
HDLC SERPL: 4.2 (ref 0–5)
HGB BLD-MCNC: 15.9 G/DL (ref 12.1–17)
LDLC SERPL CALC-MCNC: 86.2 MG/DL (ref 0–100)
MCH RBC QN AUTO: 32.1 PG (ref 26–34)
MCHC RBC AUTO-ENTMCNC: 34 G/DL (ref 30–36.5)
MCV RBC AUTO: 94.2 FL (ref 80–99)
NRBC # BLD: 0 K/UL (ref 0–0.01)
NRBC BLD-RTO: 0 PER 100 WBC
PLATELET # BLD AUTO: 248 K/UL (ref 150–400)
PMV BLD AUTO: 11.6 FL (ref 8.9–12.9)
POTASSIUM SERPL-SCNC: 3.9 MMOL/L (ref 3.5–5.1)
PROT SERPL-MCNC: 7.8 G/DL (ref 6.4–8.2)
PSA SERPL-MCNC: 2.1 NG/ML (ref 0.01–4)
RBC # BLD AUTO: 4.96 M/UL (ref 4.1–5.7)
SODIUM SERPL-SCNC: 135 MMOL/L (ref 136–145)
TRIGL SERPL-MCNC: 239 MG/DL
TSH SERPL DL<=0.05 MIU/L-ACNC: 1.81 UIU/ML (ref 0.36–3.74)
VLDLC SERPL CALC-MCNC: 47.8 MG/DL
WBC # BLD AUTO: 10.3 K/UL (ref 4.1–11.1)

## 2023-11-09 PROCEDURE — 3017F COLORECTAL CA SCREEN DOC REV: CPT | Performed by: INTERNAL MEDICINE

## 2023-11-09 PROCEDURE — 2022F DILAT RTA XM EVC RTNOPTHY: CPT | Performed by: INTERNAL MEDICINE

## 2023-11-09 PROCEDURE — G8427 DOCREV CUR MEDS BY ELIG CLIN: HCPCS | Performed by: INTERNAL MEDICINE

## 2023-11-09 PROCEDURE — 3077F SYST BP >= 140 MM HG: CPT | Performed by: INTERNAL MEDICINE

## 2023-11-09 PROCEDURE — G0438 PPPS, INITIAL VISIT: HCPCS | Performed by: INTERNAL MEDICINE

## 2023-11-09 PROCEDURE — 1123F ACP DISCUSS/DSCN MKR DOCD: CPT | Performed by: INTERNAL MEDICINE

## 2023-11-09 PROCEDURE — 99213 OFFICE O/P EST LOW 20 MIN: CPT | Performed by: INTERNAL MEDICINE

## 2023-11-09 PROCEDURE — 3080F DIAST BP >= 90 MM HG: CPT | Performed by: INTERNAL MEDICINE

## 2023-11-09 PROCEDURE — G8484 FLU IMMUNIZE NO ADMIN: HCPCS | Performed by: INTERNAL MEDICINE

## 2023-11-09 PROCEDURE — G8419 CALC BMI OUT NRM PARAM NOF/U: HCPCS | Performed by: INTERNAL MEDICINE

## 2023-11-09 PROCEDURE — 4004F PT TOBACCO SCREEN RCVD TLK: CPT | Performed by: INTERNAL MEDICINE

## 2023-11-09 PROCEDURE — 3046F HEMOGLOBIN A1C LEVEL >9.0%: CPT | Performed by: INTERNAL MEDICINE

## 2023-11-09 RX ORDER — HYDROCHLOROTHIAZIDE 25 MG/1
25 TABLET ORAL DAILY
Qty: 90 TABLET | Refills: 0 | Status: SHIPPED | OUTPATIENT
Start: 2023-11-09

## 2023-11-09 RX ORDER — LISINOPRIL 40 MG/1
40 TABLET ORAL DAILY
Qty: 90 TABLET | Refills: 0 | Status: SHIPPED | OUTPATIENT
Start: 2023-11-09

## 2023-11-09 RX ORDER — GLIPIZIDE 10 MG/1
10 TABLET ORAL 2 TIMES DAILY
Qty: 180 TABLET | Refills: 0 | Status: SHIPPED | OUTPATIENT
Start: 2023-11-09 | End: 2024-02-07

## 2023-11-09 RX ORDER — ESCITALOPRAM OXALATE 10 MG/1
10 TABLET ORAL
Qty: 90 TABLET | Refills: 1 | Status: SHIPPED | OUTPATIENT
Start: 2023-11-09

## 2023-11-09 RX ORDER — ASPIRIN 81 MG/1
81 TABLET ORAL DAILY
COMMUNITY

## 2023-11-09 SDOH — ECONOMIC STABILITY: HOUSING INSECURITY
IN THE LAST 12 MONTHS, WAS THERE A TIME WHEN YOU DID NOT HAVE A STEADY PLACE TO SLEEP OR SLEPT IN A SHELTER (INCLUDING NOW)?: NO

## 2023-11-09 SDOH — ECONOMIC STABILITY: FOOD INSECURITY: WITHIN THE PAST 12 MONTHS, THE FOOD YOU BOUGHT JUST DIDN'T LAST AND YOU DIDN'T HAVE MONEY TO GET MORE.: SOMETIMES TRUE

## 2023-11-09 SDOH — ECONOMIC STABILITY: FOOD INSECURITY: WITHIN THE PAST 12 MONTHS, YOU WORRIED THAT YOUR FOOD WOULD RUN OUT BEFORE YOU GOT MONEY TO BUY MORE.: SOMETIMES TRUE

## 2023-11-09 SDOH — ECONOMIC STABILITY: INCOME INSECURITY: HOW HARD IS IT FOR YOU TO PAY FOR THE VERY BASICS LIKE FOOD, HOUSING, MEDICAL CARE, AND HEATING?: NOT HARD AT ALL

## 2023-11-09 ASSESSMENT — PATIENT HEALTH QUESTIONNAIRE - PHQ9
2. FEELING DOWN, DEPRESSED OR HOPELESS: 0
SUM OF ALL RESPONSES TO PHQ QUESTIONS 1-9: 0
SUM OF ALL RESPONSES TO PHQ QUESTIONS 1-9: 0
SUM OF ALL RESPONSES TO PHQ9 QUESTIONS 1 & 2: 0
SUM OF ALL RESPONSES TO PHQ QUESTIONS 1-9: 0
SUM OF ALL RESPONSES TO PHQ QUESTIONS 1-9: 0
1. LITTLE INTEREST OR PLEASURE IN DOING THINGS: 0

## 2023-11-09 ASSESSMENT — LIFESTYLE VARIABLES
HOW MANY STANDARD DRINKS CONTAINING ALCOHOL DO YOU HAVE ON A TYPICAL DAY: PATIENT DOES NOT DRINK
HOW OFTEN DO YOU HAVE A DRINK CONTAINING ALCOHOL: NEVER

## 2023-11-09 NOTE — TELEPHONE ENCOUNTER
Priscilla Luz with [address] [place] [address] Avenue called to get Clarification on the Novolin 70-30 insulin on whether it is a PEN or a VIAL?   Please call Priscilla Luz back at #[phone]

## 2023-11-09 NOTE — PROGRESS NOTES
Neymar Qureshi is a 70 y.o.  male and presents with     Chief Complaint   Patient presents with    Follow-up     Went to ER. Diverticulitis. ER doctor recommended a colonoscopy. Has has one 12 years ago. Anxiety     Keeps him up at night. Medication Refill    Medicare AW     Patient was last seen in April 2022. He was recently seen in the emergency room and was diagnosed with diverticulosis. He does have constipation  He is having anxiety and unable to sleep at night. He also needs refills on his diabetes medications      Past Medical History:   Diagnosis Date    Diabetes ([address])     Hx of heart artery stent 05/2020    Hypertension     Other ill-defined conditions(799.89)     Diverticulitis     Past Surgical History:   Procedure Laterality Date    APPENDECTOMY      ORTHOPEDIC SURGERY      LEFT ankle fracture repair. Metal placed. Current Outpatient Medications   Medication Sig    aspirin 81 MG EC tablet Take 1 tablet by mouth daily    glipiZIDE (GLUCOTROL) 10 MG tablet Take 1 tablet by mouth 2 times daily    hydroCHLOROthiazide (HYDRODIURIL) 25 MG tablet Take 1 tablet by mouth daily high blood pressure    lisinopril (PRINIVIL;ZESTRIL) 40 MG tablet Take 1 tablet by mouth daily    metFORMIN (GLUCOPHAGE) 1000 MG tablet Take 1 tablet by mouth 2 times daily (with meals)    insulin 70-30 (HUMULIN;NOVOLIN) (70-30) 100 UNIT per ML injection vial Administer 10 units  subcut twice daily    escitalopram (LEXAPRO) 10 MG tablet Take 1 tablet by mouth nightly    atorvastatin (LIPITOR) 10 MG tablet Take 1 tablet by mouth daily    clopidogrel (PLAVIX) 75 MG tablet Take 1 tablet by mouth daily (Patient not taking: Reported on 11/9/2023)    traZODone (DESYREL) 50 MG tablet Take 1 tablet by mouth nightly (Patient not taking: Reported on 11/9/2023)     No current facility-administered medications for this visit.      Health Maintenance   Topic Date Due    COVID-19 Vaccine (1) Never done    Diabetic retinal exam  Never done

## 2024-04-24 ENCOUNTER — COMMUNITY OUTREACH (OUTPATIENT)
Dept: PRIMARY CARE CLINIC | Facility: CLINIC | Age: 69
End: 2024-04-24

## 2024-07-29 DIAGNOSIS — R80.9 TYPE 2 DIABETES MELLITUS WITH MICROALBUMINURIA (HCC): ICD-10-CM

## 2024-07-29 DIAGNOSIS — E11.29 TYPE 2 DIABETES MELLITUS WITH MICROALBUMINURIA (HCC): ICD-10-CM

## 2024-07-29 RX ORDER — GLIPIZIDE 10 MG/1
10 TABLET ORAL 2 TIMES DAILY
Qty: 180 TABLET | Refills: 0 | Status: SHIPPED | OUTPATIENT
Start: 2024-07-29 | End: 2024-10-27

## 2024-07-29 NOTE — TELEPHONE ENCOUNTER
Patient called to get refills on Metformin 1000mg and Glipizide 10mg to be sent to [facility] on [address] [facility] in [place].  Patient has an appt with Dr. Ceja on 10/3.

## 2024-10-03 ENCOUNTER — OFFICE VISIT (OUTPATIENT)
Dept: PRIMARY CARE CLINIC | Facility: CLINIC | Age: 69
End: 2024-10-03
Payer: MEDICARE

## 2024-10-03 VITALS
HEIGHT: 67 IN | BODY MASS INDEX: 26.37 KG/M2 | WEIGHT: 168 LBS | DIASTOLIC BLOOD PRESSURE: 91 MMHG | HEART RATE: 96 BPM | RESPIRATION RATE: 16 BRPM | TEMPERATURE: 97.3 F | SYSTOLIC BLOOD PRESSURE: 138 MMHG | OXYGEN SATURATION: 97 %

## 2024-10-03 DIAGNOSIS — F41.9 ANXIETY: ICD-10-CM

## 2024-10-03 DIAGNOSIS — E11.29 TYPE 2 DIABETES MELLITUS WITH MICROALBUMINURIA (HCC): Primary | ICD-10-CM

## 2024-10-03 DIAGNOSIS — Z12.11 COLON CANCER SCREENING: ICD-10-CM

## 2024-10-03 DIAGNOSIS — I25.119 ATHEROSCLEROSIS OF NATIVE CORONARY ARTERY OF NATIVE HEART WITH ANGINA PECTORIS (HCC): ICD-10-CM

## 2024-10-03 DIAGNOSIS — Z12.5 PROSTATE CANCER SCREENING: ICD-10-CM

## 2024-10-03 DIAGNOSIS — R80.9 TYPE 2 DIABETES MELLITUS WITH MICROALBUMINURIA (HCC): Primary | ICD-10-CM

## 2024-10-03 DIAGNOSIS — I10 ESSENTIAL HYPERTENSION: ICD-10-CM

## 2024-10-03 LAB — GLUCOSE, POC: 101 MG/DL

## 2024-10-03 PROCEDURE — 3075F SYST BP GE 130 - 139MM HG: CPT | Performed by: INTERNAL MEDICINE

## 2024-10-03 PROCEDURE — 3046F HEMOGLOBIN A1C LEVEL >9.0%: CPT | Performed by: INTERNAL MEDICINE

## 2024-10-03 PROCEDURE — 3017F COLORECTAL CA SCREEN DOC REV: CPT | Performed by: INTERNAL MEDICINE

## 2024-10-03 PROCEDURE — G8427 DOCREV CUR MEDS BY ELIG CLIN: HCPCS | Performed by: INTERNAL MEDICINE

## 2024-10-03 PROCEDURE — 99214 OFFICE O/P EST MOD 30 MIN: CPT | Performed by: INTERNAL MEDICINE

## 2024-10-03 PROCEDURE — G8484 FLU IMMUNIZE NO ADMIN: HCPCS | Performed by: INTERNAL MEDICINE

## 2024-10-03 PROCEDURE — G8419 CALC BMI OUT NRM PARAM NOF/U: HCPCS | Performed by: INTERNAL MEDICINE

## 2024-10-03 PROCEDURE — 82962 GLUCOSE BLOOD TEST: CPT | Performed by: INTERNAL MEDICINE

## 2024-10-03 PROCEDURE — 2022F DILAT RTA XM EVC RTNOPTHY: CPT | Performed by: INTERNAL MEDICINE

## 2024-10-03 PROCEDURE — 1123F ACP DISCUSS/DSCN MKR DOCD: CPT | Performed by: INTERNAL MEDICINE

## 2024-10-03 PROCEDURE — 3080F DIAST BP >= 90 MM HG: CPT | Performed by: INTERNAL MEDICINE

## 2024-10-03 PROCEDURE — 4004F PT TOBACCO SCREEN RCVD TLK: CPT | Performed by: INTERNAL MEDICINE

## 2024-10-03 RX ORDER — GLIPIZIDE 10 MG/1
10 TABLET ORAL 2 TIMES DAILY
Qty: 180 TABLET | Refills: 1 | Status: SHIPPED | OUTPATIENT
Start: 2024-10-03 | End: 2025-04-01

## 2024-10-03 RX ORDER — HYDROCHLOROTHIAZIDE 25 MG/1
25 TABLET ORAL DAILY
Qty: 90 TABLET | Refills: 1 | Status: SHIPPED | OUTPATIENT
Start: 2024-10-03

## 2024-10-03 RX ORDER — LISINOPRIL 40 MG/1
40 TABLET ORAL DAILY
Qty: 90 TABLET | Refills: 1 | Status: SHIPPED | OUTPATIENT
Start: 2024-10-03

## 2024-10-03 ASSESSMENT — PATIENT HEALTH QUESTIONNAIRE - PHQ9
1. LITTLE INTEREST OR PLEASURE IN DOING THINGS: NOT AT ALL
SUM OF ALL RESPONSES TO PHQ QUESTIONS 1-9: 0
SUM OF ALL RESPONSES TO PHQ9 QUESTIONS 1 & 2: 0
SUM OF ALL RESPONSES TO PHQ QUESTIONS 1-9: 0
2. FEELING DOWN, DEPRESSED OR HOPELESS: NOT AT ALL

## 2024-10-03 NOTE — PROGRESS NOTES
\"Have you been to the ER, urgent care clinic since your last visit?  Hospitalized since your last visit?\"    NO    “Have you seen or consulted any other health care providers outside our system since your last visit?”    NO      “Have you had a colorectal cancer screening such as a colonoscopy/FIT/Cologuard?    YES - Type: Colonoscopy - Where: Patient unsure stated several years ago   Nurse/CMA to request most recent records if not in the chart     No colonoscopy on file  No cologuard on file  No FIT/FOBT on file   No flexible sigmoidoscopy on file     “Have you had a diabetic eye exam?”    NO     No diabetic eye exam on file          
mellitus with microalbuminuria (HCC)  -     Hemoglobin A1C; Future  -     Lipid Panel; Future  -     CBC; Future  -     Urinalysis; Future  -     PSA Screening; Future  -     Comprehensive Metabolic Panel; Future  -     Microalbumin / Creatinine Urine Ratio; Future  -     glipiZIDE (GLUCOTROL) 10 MG tablet; Take 1 tablet by mouth 2 times daily, Disp-180 tablet, R-1Normal  -     metFORMIN (GLUCOPHAGE) 1000 MG tablet; Take 1 tablet by mouth 2 times daily (with meals), Disp-180 tablet, R-1Normal  -     AMB POC GLUCOSE BLOOD, BY GLUCOSE MONITORING DEVICE  2. Essential hypertension  -     hydroCHLOROthiazide (HYDRODIURIL) 25 MG tablet; Take 1 tablet by mouth daily high blood pressure, Disp-90 tablet, R-1Normal  -     lisinopril (PRINIVIL;ZESTRIL) 40 MG tablet; Take 1 tablet by mouth daily, Disp-90 tablet, R-1Normal  3. Colon cancer screening  -     [facility] - Sarai Ramírez MD, [facility] ([facility] [address])  4. Prostate cancer screening  -     PSA Screening; Future  5. Atherosclerosis of native coronary artery of native heart with angina pectoris (HCC)  6. Anxiety     DM/HTN/CAD - stable    Explained to patient risk benefits of the medications.Advised patient to stop meds if having any side effects.Pt verbalized understanding of the instructions.    I have discussed the diagnosis with the patient and the intended plan as seen in the above orders.  The patient has received an after-visit summary and questions were answered concerning future plans.  I have discussed medication side effects and warnings with the patient as well. I have reviewed the plan of care with the patient, accepted their input and they are in agreement with the treatment goals.     Reviewed plan of care. Patient has provided input and agrees with goals.    No follow-up provider specified.    Nitesh Ceja MD

## 2024-11-15 ENCOUNTER — TELEPHONE (OUTPATIENT)
Dept: PRIMARY CARE CLINIC | Facility: CLINIC | Age: 69
End: 2024-11-15

## 2024-11-15 DIAGNOSIS — I10 ESSENTIAL HYPERTENSION: ICD-10-CM

## 2024-11-15 DIAGNOSIS — R80.9 TYPE 2 DIABETES MELLITUS WITH MICROALBUMINURIA (HCC): ICD-10-CM

## 2024-11-15 DIAGNOSIS — E11.29 TYPE 2 DIABETES MELLITUS WITH MICROALBUMINURIA (HCC): ICD-10-CM

## 2024-11-15 NOTE — TELEPHONE ENCOUNTER
PCP: Nitesh Ceja MD    Last Visit 10/3/2024   No future appointments.    Requested Prescriptions      No prescriptions requested or ordered in this encounter         Other Comments: Last Refill

## 2024-11-18 ENCOUNTER — TELEPHONE (OUTPATIENT)
Dept: PRIMARY CARE CLINIC | Facility: CLINIC | Age: 69
End: 2024-11-18

## 2024-11-18 NOTE — TELEPHONE ENCOUNTER
Pt is calling because he requested refill on   lisinopril (PRINIVIL;ZESTRIL) 40 MG tablet    But his pharmacy never received it. He only has 3 pills left and needs for it to be sent to [facility] [place] [address] [address] [phone] - F [phone]  He requested for a nurse to call him once completed